# Patient Record
Sex: FEMALE | Race: WHITE | URBAN - METROPOLITAN AREA
[De-identification: names, ages, dates, MRNs, and addresses within clinical notes are randomized per-mention and may not be internally consistent; named-entity substitution may affect disease eponyms.]

---

## 2018-04-10 ENCOUNTER — HOSPITAL ENCOUNTER (INPATIENT)
Facility: CLINIC | Age: 53
LOS: 5 days | Discharge: HOME | DRG: 872 | End: 2018-04-15
Attending: EMERGENCY MEDICINE | Admitting: INTERNAL MEDICINE
Payer: COMMERCIAL

## 2018-04-10 DIAGNOSIS — L03.116 LEFT LEG CELLULITIS: ICD-10-CM

## 2018-04-10 DIAGNOSIS — A41.9 SEPSIS, DUE TO UNSPECIFIED ORGANISM: ICD-10-CM

## 2018-04-10 LAB
ALBUMIN SERPL-MCNC: 3.4 G/DL (ref 3.4–5)
ALP SERPL-CCNC: 73 U/L (ref 40–150)
ALT SERPL W P-5'-P-CCNC: 28 U/L (ref 0–50)
ANION GAP SERPL CALCULATED.3IONS-SCNC: 9 MMOL/L (ref 3–14)
APTT PPP: 30 SEC (ref 22–37)
AST SERPL W P-5'-P-CCNC: 25 U/L (ref 0–45)
BASOPHILS # BLD AUTO: 0 10E9/L (ref 0–0.2)
BASOPHILS NFR BLD AUTO: 0.2 %
BILIRUB SERPL-MCNC: 0.7 MG/DL (ref 0.2–1.3)
BUN SERPL-MCNC: 10 MG/DL (ref 7–30)
CALCIUM SERPL-MCNC: 8.8 MG/DL (ref 8.5–10.1)
CHLORIDE SERPL-SCNC: 101 MMOL/L (ref 94–109)
CO2 BLDCOV-SCNC: 19 MMOL/L (ref 21–28)
CO2 SERPL-SCNC: 23 MMOL/L (ref 20–32)
CREAT SERPL-MCNC: 0.79 MG/DL (ref 0.52–1.04)
DIFFERENTIAL METHOD BLD: ABNORMAL
EOSINOPHIL # BLD AUTO: 0 10E9/L (ref 0–0.7)
EOSINOPHIL NFR BLD AUTO: 0 %
ERYTHROCYTE [DISTWIDTH] IN BLOOD BY AUTOMATED COUNT: 13.3 % (ref 10–15)
GFR SERPL CREATININE-BSD FRML MDRD: 76 ML/MIN/1.7M2
GLUCOSE SERPL-MCNC: 107 MG/DL (ref 70–99)
HCG SERPL QL: NEGATIVE
HCT VFR BLD AUTO: 40.9 % (ref 35–47)
HGB BLD-MCNC: 14 G/DL (ref 11.7–15.7)
IMM GRANULOCYTES # BLD: 0.1 10E9/L (ref 0–0.4)
IMM GRANULOCYTES NFR BLD: 0.7 %
INR PPP: 1.08 (ref 0.86–1.14)
LACTATE BLD-SCNC: 1.6 MMOL/L (ref 0.7–2)
LACTATE BLD-SCNC: 1.6 MMOL/L (ref 0.7–2.1)
LYMPHOCYTES # BLD AUTO: 0.7 10E9/L (ref 0.8–5.3)
LYMPHOCYTES NFR BLD AUTO: 6.1 %
MCH RBC QN AUTO: 32.7 PG (ref 26.5–33)
MCHC RBC AUTO-ENTMCNC: 34.2 G/DL (ref 31.5–36.5)
MCV RBC AUTO: 96 FL (ref 78–100)
MONOCYTES # BLD AUTO: 0.4 10E9/L (ref 0–1.3)
MONOCYTES NFR BLD AUTO: 4 %
NEUTROPHILS # BLD AUTO: 9.5 10E9/L (ref 1.6–8.3)
NEUTROPHILS NFR BLD AUTO: 89 %
NRBC # BLD AUTO: 0 10*3/UL
NRBC BLD AUTO-RTO: 0 /100
PCO2 BLDV: 28 MM HG (ref 40–50)
PH BLDV: 7.44 PH (ref 7.32–7.43)
PLATELET # BLD AUTO: 212 10E9/L (ref 150–450)
PO2 BLDV: 39 MM HG (ref 25–47)
POTASSIUM SERPL-SCNC: 3.3 MMOL/L (ref 3.4–5.3)
PROT SERPL-MCNC: 7.1 G/DL (ref 6.8–8.8)
RBC # BLD AUTO: 4.28 10E12/L (ref 3.8–5.2)
SAO2 % BLDV FROM PO2: 77 %
SODIUM SERPL-SCNC: 133 MMOL/L (ref 133–144)
WBC # BLD AUTO: 10.6 10E9/L (ref 4–11)

## 2018-04-10 PROCEDURE — 96368 THER/DIAG CONCURRENT INF: CPT

## 2018-04-10 PROCEDURE — 83605 ASSAY OF LACTIC ACID: CPT

## 2018-04-10 PROCEDURE — 96375 TX/PRO/DX INJ NEW DRUG ADDON: CPT

## 2018-04-10 PROCEDURE — 99223 1ST HOSP IP/OBS HIGH 75: CPT | Mod: AI | Performed by: INTERNAL MEDICINE

## 2018-04-10 PROCEDURE — 96365 THER/PROPH/DIAG IV INF INIT: CPT

## 2018-04-10 PROCEDURE — 82803 BLOOD GASES ANY COMBINATION: CPT

## 2018-04-10 PROCEDURE — 25000132 ZZH RX MED GY IP 250 OP 250 PS 637: Performed by: EMERGENCY MEDICINE

## 2018-04-10 PROCEDURE — 80053 COMPREHEN METABOLIC PANEL: CPT | Performed by: EMERGENCY MEDICINE

## 2018-04-10 PROCEDURE — 96366 THER/PROPH/DIAG IV INF ADDON: CPT

## 2018-04-10 PROCEDURE — 83605 ASSAY OF LACTIC ACID: CPT | Performed by: EMERGENCY MEDICINE

## 2018-04-10 PROCEDURE — 87040 BLOOD CULTURE FOR BACTERIA: CPT | Performed by: EMERGENCY MEDICINE

## 2018-04-10 PROCEDURE — 36415 COLL VENOUS BLD VENIPUNCTURE: CPT | Performed by: EMERGENCY MEDICINE

## 2018-04-10 PROCEDURE — 85730 THROMBOPLASTIN TIME PARTIAL: CPT | Performed by: EMERGENCY MEDICINE

## 2018-04-10 PROCEDURE — 93005 ELECTROCARDIOGRAM TRACING: CPT

## 2018-04-10 PROCEDURE — 12000000 ZZH R&B MED SURG/OB

## 2018-04-10 PROCEDURE — 20000003 ZZH R&B ICU

## 2018-04-10 PROCEDURE — 96367 TX/PROPH/DG ADDL SEQ IV INF: CPT

## 2018-04-10 PROCEDURE — 36415 COLL VENOUS BLD VENIPUNCTURE: CPT

## 2018-04-10 PROCEDURE — 84703 CHORIONIC GONADOTROPIN ASSAY: CPT | Performed by: EMERGENCY MEDICINE

## 2018-04-10 PROCEDURE — 85025 COMPLETE CBC W/AUTO DIFF WBC: CPT | Performed by: EMERGENCY MEDICINE

## 2018-04-10 PROCEDURE — 25000128 H RX IP 250 OP 636: Performed by: EMERGENCY MEDICINE

## 2018-04-10 PROCEDURE — 99285 EMERGENCY DEPT VISIT HI MDM: CPT | Mod: 25

## 2018-04-10 PROCEDURE — 85610 PROTHROMBIN TIME: CPT | Performed by: EMERGENCY MEDICINE

## 2018-04-10 RX ORDER — ONDANSETRON 2 MG/ML
4 INJECTION INTRAMUSCULAR; INTRAVENOUS ONCE
Status: COMPLETED | OUTPATIENT
Start: 2018-04-10 | End: 2018-04-10

## 2018-04-10 RX ORDER — SODIUM CHLORIDE, SODIUM LACTATE, POTASSIUM CHLORIDE, CALCIUM CHLORIDE 600; 310; 30; 20 MG/100ML; MG/100ML; MG/100ML; MG/100ML
1000 INJECTION, SOLUTION INTRAVENOUS CONTINUOUS
Status: DISCONTINUED | OUTPATIENT
Start: 2018-04-10 | End: 2018-04-11

## 2018-04-10 RX ORDER — CIPROFLOXACIN 2 MG/ML
400 INJECTION, SOLUTION INTRAVENOUS ONCE
Status: COMPLETED | OUTPATIENT
Start: 2018-04-10 | End: 2018-04-10

## 2018-04-10 RX ORDER — POTASSIUM CHLORIDE 1500 MG/1
40 TABLET, EXTENDED RELEASE ORAL ONCE
Status: COMPLETED | OUTPATIENT
Start: 2018-04-10 | End: 2018-04-10

## 2018-04-10 RX ORDER — HYDROMORPHONE HYDROCHLORIDE 1 MG/ML
0.5 INJECTION, SOLUTION INTRAMUSCULAR; INTRAVENOUS; SUBCUTANEOUS
Status: DISCONTINUED | OUTPATIENT
Start: 2018-04-10 | End: 2018-04-11

## 2018-04-10 RX ORDER — NAPROXEN SODIUM 220 MG
220 TABLET ORAL 2 TIMES DAILY PRN
COMMUNITY

## 2018-04-10 RX ORDER — PROMETHAZINE HYDROCHLORIDE 25 MG/ML
25 INJECTION, SOLUTION INTRAMUSCULAR; INTRAVENOUS ONCE
Status: COMPLETED | OUTPATIENT
Start: 2018-04-10 | End: 2018-04-10

## 2018-04-10 RX ORDER — MULTIPLE VITAMINS W/ MINERALS TAB 9MG-400MCG
1 TAB ORAL DAILY
COMMUNITY

## 2018-04-10 RX ORDER — AMPICILLIN AND SULBACTAM 2; 1 G/1; G/1
3 INJECTION, POWDER, FOR SOLUTION INTRAMUSCULAR; INTRAVENOUS ONCE
Status: COMPLETED | OUTPATIENT
Start: 2018-04-10 | End: 2018-04-10

## 2018-04-10 RX ORDER — LORATADINE 10 MG/1
10 TABLET ORAL DAILY
COMMUNITY

## 2018-04-10 RX ADMIN — SODIUM CHLORIDE, POTASSIUM CHLORIDE, SODIUM LACTATE AND CALCIUM CHLORIDE 1000 ML: 600; 310; 30; 20 INJECTION, SOLUTION INTRAVENOUS at 22:37

## 2018-04-10 RX ADMIN — PROMETHAZINE HYDROCHLORIDE 25 MG: 25 INJECTION INTRAMUSCULAR; INTRAVENOUS at 22:00

## 2018-04-10 RX ADMIN — POTASSIUM CHLORIDE 40 MEQ: 1500 TABLET, EXTENDED RELEASE ORAL at 22:41

## 2018-04-10 RX ADMIN — ONDANSETRON 4 MG: 2 INJECTION INTRAMUSCULAR; INTRAVENOUS at 20:17

## 2018-04-10 RX ADMIN — SODIUM CHLORIDE, POTASSIUM CHLORIDE, SODIUM LACTATE AND CALCIUM CHLORIDE 1000 ML: 600; 310; 30; 20 INJECTION, SOLUTION INTRAVENOUS at 19:56

## 2018-04-10 RX ADMIN — CIPROFLOXACIN 400 MG: 2 INJECTION, SOLUTION INTRAVENOUS at 20:50

## 2018-04-10 RX ADMIN — AMPICILLIN SODIUM AND SULBACTAM SODIUM 3 G: 2; 1 INJECTION, POWDER, FOR SOLUTION INTRAMUSCULAR; INTRAVENOUS at 20:18

## 2018-04-10 RX ADMIN — VANCOMYCIN HYDROCHLORIDE 2250 MG: 1 INJECTION, POWDER, LYOPHILIZED, FOR SOLUTION INTRAVENOUS at 21:14

## 2018-04-10 RX ADMIN — SODIUM CHLORIDE, POTASSIUM CHLORIDE, SODIUM LACTATE AND CALCIUM CHLORIDE 1000 ML: 600; 310; 30; 20 INJECTION, SOLUTION INTRAVENOUS at 21:13

## 2018-04-10 RX ADMIN — HYDROMORPHONE HYDROCHLORIDE 0.5 MG: 1 INJECTION, SOLUTION INTRAMUSCULAR; INTRAVENOUS; SUBCUTANEOUS at 19:55

## 2018-04-10 ASSESSMENT — ENCOUNTER SYMPTOMS
ABDOMINAL PAIN: 0
WOUND: 0
MYALGIAS: 1
COLOR CHANGE: 1
COUGH: 0
DYSURIA: 0
VOMITING: 0
RHINORRHEA: 0
FEVER: 1
NAUSEA: 0

## 2018-04-10 NOTE — IP AVS SNAPSHOT
[place] [address] [facility]    [address]    [facility] [address]    Phone:  [phone]    Fax:  [phone]                                       After Visit Summary   4/10/2018    April Ackerman    MRN: [id number]           After Visit Summary Signature Page     I have received my discharge instructions, and my questions have been answered. I have discussed any challenges I see with this plan with the nurse or doctor.    ..........................................................................................................................................  Patient/Patient Representative Signature      ..........................................................................................................................................  Patient Representative Print Name and Relationship to Patient    ..................................................               ................................................  Date                                            Time    ..........................................................................................................................................  Reviewed by Signature/Title    ...................................................              ..............................................  Date                                                            Time

## 2018-04-10 NOTE — IP AVS SNAPSHOT
MRN:[id number]                      After Visit Summary   4/10/2018    April Ackerman    MRN: [id number]           Thank you!     Thank you for choosing [facility] for your care. Our goal is always to provide you with excellent care. Hearing back from our patients is one way we can continue to improve our services. Please take a few minutes to complete the written survey that you may receive in the mail after you visit. If you would like to speak to someone directly about your visit please contact Patient Relations at [phone]. Thank you!          Patient Information     Date Of Birth          1965        Designated Caregiver       Most Recent Value    Caregiver    Will someone help with your care after discharge? no      About your hospital stay     You were admitted on:  April 10, 2018 You last received care in the:  [address] [facility]    You were discharged on:  April 15, 2018        Reason for your hospital stay       Cellulitis                  Who to Call     For medical emergencies, please call 911.  For non-urgent questions about your medical care, please call your primary care provider or clinic, [phone]          Attending Provider     Provider Specialty    James Page MD Emergency Medicine    Keanu Reid DO Internal Medicine       Primary Care Provider Office Phone # Fax #    [facility] [phone]       When to contact your care team       Call your primary doctor if you have any of the following: increased swelling or increased pain.                  After Care Instructions     Activity       Your activity upon discharge: activity as tolerated            Diet       Follow this diet upon discharge: Orders Placed This Encounter      Advance Diet as Tolerated: Regular Diet Adult                  Follow-up Appointments     Follow-up and recommended labs and tests        Follow up with primary care  "provider, [facility], within 7 days for hospital follow- up.                  Pending Results     Date and Time Order Name Status Description    4/10/2018 1941 Blood culture Preliminary     4/10/2018 1936 Blood culture Preliminary             Statement of Approval     Ordered          04/15/18 1501  I have reviewed and agree with all the recommendations and orders detailed in this document.  EFFECTIVE NOW     Approved and electronically signed by:  Escobar Burkett MD             Admission Information     Date & Time Provider Department Dept. Phone    4/10/2018 Keanu Reid,  [place] [address] [facility] [phone]      Your Vitals Were     Blood Pressure Pulse Temperature Respirations Height Weight    122/85 (BP Location: Left arm) 89 96.4  F (35.8  C) (Oral) 20 1.676 m (5' 6\") 96.7 kg (213 lb 3 oz)    Pulse Oximetry BMI (Body Mass Index)                93% 34.41 kg/m2          [employer] Information     [employer] lets you send messages to your doctor, view your test results, renew your prescriptions, schedule appointments and more. To sign up, go to [email or web address] . Click on \"Log in\" on the left side of the screen, which will take you to the Welcome page. Then click on \"Sign up Now\" on the right side of the page.     You will be asked to enter the access code listed below, as well as some personal information. Please follow the directions to create your username and password.     Your access code is: [email or web address]  Expires: 2018  3:00 PM     Your access code will  in 90 days. If you need help or a new code, please call your [place] clinic or [phone].        Care EveryWhere ID     This is your Care EveryWhere ID. This could be used by other organizations to access your [place] medical records  [id number]        Equal Access to Services     MILAD NELSON AH: Remi Contreras, wajose magaña, qaybta kaalmichael philip, kobi causey " queenie alfaroaan ah. So [facility] [phone].    ATENCIÓN: Si darren myers, tiene a rainey disposición servicios gratuitos de asistencia lingüística. Monika barfield [phone].    We comply with applicable federal civil rights laws and [place] laws. We do not discriminate on the basis of race, color, national origin, age, disability, sex, sexual orientation, or gender identity.               Review of your medicines      START taking        Dose / Directions    amoxicillin-clavulanate 875-125 MG per tablet   Commonly known as:  AUGMENTIN   Used for:  Left leg cellulitis        Dose:  1 tablet   Take 1 tablet by mouth 2 times daily for 7 days   Quantity:  14 tablet   Refills:  0         CONTINUE these medicines which have NOT CHANGED        Dose / Directions    ALEVE 220 MG tablet   Generic drug:  naproxen sodium        Dose:  220 mg   Take 220 mg by mouth 2 times daily as needed for moderate pain   Refills:  0       loratadine 10 MG tablet   Commonly known as:  CLARITIN        Dose:  10 mg   Take 10 mg by mouth daily   Refills:  0       multivitamin, therapeutic with minerals Tabs tablet        Dose:  1 tablet   Take 1 tablet by mouth daily   Refills:  0            Where to get your medicines      These medications were sent to [facility] [address] [place] [address] [address] AT [facility] [address] [facility] [address]     Phone:  [phone]     amoxicillin-clavulanate 875-125 MG per tablet                Protect others around you: Learn how to safely use, store and throw away your medicines at www.disposemymeds.org.        ANTIBIOTIC INSTRUCTION     You've Been Prescribed an Antibiotic - Now What?  Your healthcare team thinks that you or your loved one might have an infection. Some infections can be treated with antibiotics, which are powerful, life-saving drugs. Like all medications, antibiotics have side effects and should only be used when necessary. There are some important  things you should know about your antibiotic treatment.      Your healthcare team may run tests before you start taking an antibiotic.    Your team may take samples (e.g., from your blood, urine or other areas) to run tests to look for bacteria. These test can be important to determine if you need an antibiotic at all and, if you do, which antibiotic will work best.      Within a few days, your healthcare team might change or even stop your antibiotic.    Your team may start you on an antibiotic while they are working to find out what is making you sick.    Your team might change your antibiotic because test results show that a different antibiotic would be better to treat your infection.    In some cases, once your team has more information, they learn that you do not need an antibiotic at all. They may find out that you don't have an infection, or that the antibiotic you're taking won't work against your infection. For example, an infection caused by a virus can't be treated with antibiotics. Staying on an antibiotic when you don't need it is more likely to be harmful than helpful.      You may experience side effects from your antibiotic.    Like all medications, antibiotics have side effects. Some of these can be serious.    Let you healthcare team know if you have any known allergies when you are admitted to the hospital.    One significant side effect of nearly all antibiotics is the risk of severe and sometimes deadly diarrhea caused by Clostridium difficile (C. Difficile). This occurs when a person takes antibiotics because some good germs are destroyed. Antibiotic use allows C. diificile to take over, putting patients at high risk for this serious infection.    As a patient or caregiver, it is important to understand your or your loved one's antibiotic treatment. It is especially important for caregivers to speak up when patients can't speak for themselves. Here are some important questions to ask your  healthcare team.    What infection is this antibiotic treating and how do you know I have that infection?    What side effects might occur from this antibiotic?    How long will I need to take this antibiotic?    Is it safe to take this antibiotic with other medications or supplements (e.g., vitamins) that I am taking?     Are there any special directions I need to know about taking this antibiotic? For example, should I take it with food?    How will I be monitored to know whether my infection is responding to the antibiotic?    What tests may help to make sure the right antibiotic is prescribed for me?      Information provided by:  www.cdc.gov/getsmart  U.S. Department of Health and Human Services  Centers for disease Control and Prevention  National Center for Emerging and Zoonotic Infectious Diseases  Division of Healthcare Quality Promotion             Medication List: This is a list of all your medications and when to take them. Check marks below indicate your daily home schedule. Keep this list as a reference.      Medications           Morning Afternoon Evening Bedtime As Needed    ALEVE 220 MG tablet   Take 220 mg by mouth 2 times daily as needed for moderate pain   Generic drug:  naproxen sodium   Next Dose Due:  Resume at home schedule                                  amoxicillin-clavulanate 875-125 MG per tablet   Commonly known as:  AUGMENTIN   Take 1 tablet by mouth 2 times daily for 7 days   Next Dose Due:  Resume at home schedule                                loratadine 10 MG tablet   Commonly known as:  CLARITIN   Take 10 mg by mouth daily   Next Dose Due:  Resume at home schedule                                multivitamin, therapeutic with minerals Tabs tablet   Take 1 tablet by mouth daily   Next Dose Due:  Resume at home schedule                                          More Information        Cellulitis  Cellulitis is an infection of the deep layers of skin. A break in the skin, such as a cut  or scratch, can let bacteria under the skin. If the bacteria get to deep layers of the skin, it can be serious. If not treated, cellulitis can get into the bloodstream and lymph nodes. The infection can then spread throughout the body. This causes serious illness.  Cellulitis causes the affected skin to become red, swollen, warm, and sore. The reddened areas have a visible border. An open sore may leak fluid (pus). You may have a fever, chills, and pain.  Cellulitis is treated with antibiotics taken for 7 to 10 days. An open sore may be cleaned and covered with cool wet gauze. Symptoms should get better 1 to 2 days after treatment is started. Make sure to take all the antibiotics for the full number of days until they are gone. Keep taking the medicine even if your symptoms go away.  Home care  Follow these tips:    Limit the use of the part of your body with cellulitis.     If the infection is on your leg, keep your leg raised while sitting. This will help to reduce swelling.    Take all of the antibiotic medicine exactly as directed until it is gone. Do not miss any doses, especially during the first 7 days. Don t stop taking the medicine when your symptoms get better.    Keep the affected area clean and dry.    Wash your hands with soap and warm water before and after touching your skin. Anyone else who touches your skin should also wash his or her hands. Don't share towels.  Follow-up care  Follow up with your healthcare provider, or as advised. If your infection does not go away on the first antibiotic, your healthcare provider will prescribe a different one.  When to seek medical advice  Call your healthcare provider right away if any of these occur:    Red areas that spread    Swelling or pain that gets worse    Fluid leaking from the skin (pus)    Fever higher of 100.4  F (38.0  C) or higher after 2 days on antibiotics  Date Last Reviewed: 9/1/2016 2000-2017 [employer]. [address], [place] [address]. All rights reserved. This information is not intended as a substitute for professional medical care. Always follow your healthcare professional's instructions.

## 2018-04-10 NOTE — Clinical Note
Admitting Physician: JENNIFER TAFOYA [[id number]]   [facility][id number][facility]   Bed Type: Adult Med/Surg [46]   Special needs: Fall Risk [8]   Bed request comments: Med unit bed requested @6684

## 2018-04-11 ENCOUNTER — APPOINTMENT (OUTPATIENT)
Dept: ULTRASOUND IMAGING | Facility: CLINIC | Age: 53
DRG: 872 | End: 2018-04-11
Attending: INTERNAL MEDICINE
Payer: COMMERCIAL

## 2018-04-11 PROBLEM — L03.90 CELLULITIS: Status: ACTIVE | Noted: 2018-04-11

## 2018-04-11 LAB
CREAT SERPL-MCNC: 0.7 MG/DL (ref 0.52–1.04)
FLUAV+FLUBV RNA SPEC QL NAA+PROBE: NEGATIVE
FLUAV+FLUBV RNA SPEC QL NAA+PROBE: NEGATIVE
GFR SERPL CREATININE-BSD FRML MDRD: 87 ML/MIN/1.7M2
GLUCOSE BLDC GLUCOMTR-MCNC: 116 MG/DL (ref 70–99)
GLUCOSE BLDC GLUCOMTR-MCNC: 119 MG/DL (ref 70–99)
GLUCOSE BLDC GLUCOMTR-MCNC: 128 MG/DL (ref 70–99)
GLUCOSE BLDC GLUCOMTR-MCNC: 135 MG/DL (ref 70–99)
INTERPRETATION ECG - MUSE: NORMAL
MRSA DNA SPEC QL NAA+PROBE: NEGATIVE
PLATELET # BLD AUTO: 176 10E9/L (ref 150–450)
POTASSIUM SERPL-SCNC: 4.1 MMOL/L (ref 3.4–5.3)
RSV RNA SPEC NAA+PROBE: NEGATIVE
SPECIMEN SOURCE: NORMAL
SPECIMEN SOURCE: NORMAL

## 2018-04-11 PROCEDURE — 12000000 ZZH R&B MED SURG/OB

## 2018-04-11 PROCEDURE — 84132 ASSAY OF SERUM POTASSIUM: CPT | Performed by: INTERNAL MEDICINE

## 2018-04-11 PROCEDURE — 82565 ASSAY OF CREATININE: CPT | Performed by: INTERNAL MEDICINE

## 2018-04-11 PROCEDURE — 87640 STAPH A DNA AMP PROBE: CPT | Performed by: INTERNAL MEDICINE

## 2018-04-11 PROCEDURE — 00000146 ZZHCL STATISTIC GLUCOSE BY METER IP

## 2018-04-11 PROCEDURE — 36415 COLL VENOUS BLD VENIPUNCTURE: CPT | Performed by: INTERNAL MEDICINE

## 2018-04-11 PROCEDURE — 25000128 H RX IP 250 OP 636: Performed by: INTERNAL MEDICINE

## 2018-04-11 PROCEDURE — 25000132 ZZH RX MED GY IP 250 OP 250 PS 637: Performed by: INTERNAL MEDICINE

## 2018-04-11 PROCEDURE — 85049 AUTOMATED PLATELET COUNT: CPT | Performed by: INTERNAL MEDICINE

## 2018-04-11 PROCEDURE — 87631 RESP VIRUS 3-5 TARGETS: CPT | Performed by: INTERNAL MEDICINE

## 2018-04-11 PROCEDURE — 25000125 ZZHC RX 250: Performed by: INTERNAL MEDICINE

## 2018-04-11 PROCEDURE — 93971 EXTREMITY STUDY: CPT | Mod: LT

## 2018-04-11 PROCEDURE — 99233 SBSQ HOSP IP/OBS HIGH 50: CPT | Performed by: INTERNAL MEDICINE

## 2018-04-11 PROCEDURE — 87641 MR-STAPH DNA AMP PROBE: CPT | Performed by: INTERNAL MEDICINE

## 2018-04-11 RX ORDER — PROCHLORPERAZINE MALEATE 5 MG
10 TABLET ORAL EVERY 6 HOURS PRN
Status: DISCONTINUED | OUTPATIENT
Start: 2018-04-11 | End: 2018-04-15 | Stop reason: HOSPADM

## 2018-04-11 RX ORDER — PROCHLORPERAZINE 25 MG
25 SUPPOSITORY, RECTAL RECTAL EVERY 12 HOURS PRN
Status: DISCONTINUED | OUTPATIENT
Start: 2018-04-11 | End: 2018-04-15 | Stop reason: HOSPADM

## 2018-04-11 RX ORDER — LEVOFLOXACIN 5 MG/ML
750 INJECTION, SOLUTION INTRAVENOUS EVERY 24 HOURS
Status: DISCONTINUED | OUTPATIENT
Start: 2018-04-11 | End: 2018-04-15 | Stop reason: HOSPADM

## 2018-04-11 RX ORDER — DOXYCYCLINE 100 MG/10ML
100 INJECTION, POWDER, LYOPHILIZED, FOR SOLUTION INTRAVENOUS EVERY 12 HOURS
Status: DISCONTINUED | OUTPATIENT
Start: 2018-04-11 | End: 2018-04-13

## 2018-04-11 RX ORDER — POTASSIUM CHLORIDE 29.8 MG/ML
20 INJECTION INTRAVENOUS
Status: DISCONTINUED | OUTPATIENT
Start: 2018-04-11 | End: 2018-04-15 | Stop reason: HOSPADM

## 2018-04-11 RX ORDER — TRAZODONE HYDROCHLORIDE 50 MG/1
50 TABLET, FILM COATED ORAL
Status: DISCONTINUED | OUTPATIENT
Start: 2018-04-11 | End: 2018-04-15 | Stop reason: HOSPADM

## 2018-04-11 RX ORDER — POTASSIUM CHLORIDE 7.45 MG/ML
10 INJECTION INTRAVENOUS
Status: DISCONTINUED | OUTPATIENT
Start: 2018-04-11 | End: 2018-04-15 | Stop reason: HOSPADM

## 2018-04-11 RX ORDER — SODIUM CHLORIDE 9 MG/ML
INJECTION, SOLUTION INTRAVENOUS CONTINUOUS
Status: DISCONTINUED | OUTPATIENT
Start: 2018-04-11 | End: 2018-04-15 | Stop reason: HOSPADM

## 2018-04-11 RX ORDER — POTASSIUM CHLORIDE 1.5 G/1.58G
20-40 POWDER, FOR SOLUTION ORAL
Status: DISCONTINUED | OUTPATIENT
Start: 2018-04-11 | End: 2018-04-15 | Stop reason: HOSPADM

## 2018-04-11 RX ORDER — ONDANSETRON 2 MG/ML
4 INJECTION INTRAMUSCULAR; INTRAVENOUS EVERY 6 HOURS PRN
Status: DISCONTINUED | OUTPATIENT
Start: 2018-04-11 | End: 2018-04-15 | Stop reason: HOSPADM

## 2018-04-11 RX ORDER — CEFAZOLIN SODIUM 1 G/50ML
1 INJECTION, SOLUTION INTRAVENOUS EVERY 8 HOURS
Status: DISCONTINUED | OUTPATIENT
Start: 2018-04-11 | End: 2018-04-11

## 2018-04-11 RX ORDER — NALOXONE HYDROCHLORIDE 0.4 MG/ML
.1-.4 INJECTION, SOLUTION INTRAMUSCULAR; INTRAVENOUS; SUBCUTANEOUS
Status: DISCONTINUED | OUTPATIENT
Start: 2018-04-11 | End: 2018-04-15 | Stop reason: HOSPADM

## 2018-04-11 RX ORDER — DOXYCYCLINE 100 MG/10ML
100 INJECTION, POWDER, LYOPHILIZED, FOR SOLUTION INTRAVENOUS EVERY 12 HOURS
Status: DISCONTINUED | OUTPATIENT
Start: 2018-04-11 | End: 2018-04-11

## 2018-04-11 RX ORDER — OXYCODONE HYDROCHLORIDE 5 MG/1
5-10 TABLET ORAL
Status: DISCONTINUED | OUTPATIENT
Start: 2018-04-11 | End: 2018-04-15 | Stop reason: HOSPADM

## 2018-04-11 RX ORDER — POTASSIUM CHLORIDE 1500 MG/1
20-40 TABLET, EXTENDED RELEASE ORAL
Status: DISCONTINUED | OUTPATIENT
Start: 2018-04-11 | End: 2018-04-15 | Stop reason: HOSPADM

## 2018-04-11 RX ORDER — CEFAZOLIN SODIUM 2 G/100ML
2 INJECTION, SOLUTION INTRAVENOUS EVERY 8 HOURS
Status: DISCONTINUED | OUTPATIENT
Start: 2018-04-11 | End: 2018-04-15 | Stop reason: HOSPADM

## 2018-04-11 RX ORDER — ONDANSETRON 4 MG/1
4 TABLET, ORALLY DISINTEGRATING ORAL EVERY 6 HOURS PRN
Status: DISCONTINUED | OUTPATIENT
Start: 2018-04-11 | End: 2018-04-15 | Stop reason: HOSPADM

## 2018-04-11 RX ORDER — LIDOCAINE 40 MG/G
CREAM TOPICAL
Status: DISCONTINUED | OUTPATIENT
Start: 2018-04-11 | End: 2018-04-15 | Stop reason: HOSPADM

## 2018-04-11 RX ORDER — ACETAMINOPHEN 325 MG/1
650 TABLET ORAL EVERY 4 HOURS PRN
Status: DISCONTINUED | OUTPATIENT
Start: 2018-04-11 | End: 2018-04-15 | Stop reason: HOSPADM

## 2018-04-11 RX ORDER — POTASSIUM CL/LIDO/0.9 % NACL 10MEQ/0.1L
10 INTRAVENOUS SOLUTION, PIGGYBACK (ML) INTRAVENOUS
Status: DISCONTINUED | OUTPATIENT
Start: 2018-04-11 | End: 2018-04-15 | Stop reason: HOSPADM

## 2018-04-11 RX ORDER — HEPARIN SODIUM,PORCINE 10 UNIT/ML
2-5 VIAL (ML) INTRAVENOUS
Status: DISCONTINUED | OUTPATIENT
Start: 2018-04-11 | End: 2018-04-15 | Stop reason: HOSPADM

## 2018-04-11 RX ADMIN — ENOXAPARIN SODIUM 40 MG: 40 INJECTION SUBCUTANEOUS at 08:34

## 2018-04-11 RX ADMIN — DOXYCYCLINE 100 MG: 100 INJECTION, POWDER, LYOPHILIZED, FOR SOLUTION INTRAVENOUS at 03:59

## 2018-04-11 RX ADMIN — ACETAMINOPHEN 650 MG: 325 TABLET ORAL at 04:44

## 2018-04-11 RX ADMIN — OXYCODONE HYDROCHLORIDE 5 MG: 5 TABLET ORAL at 13:40

## 2018-04-11 RX ADMIN — SODIUM CHLORIDE: 9 INJECTION, SOLUTION INTRAVENOUS at 02:17

## 2018-04-11 RX ADMIN — LEVOFLOXACIN 750 MG: 5 INJECTION, SOLUTION INTRAVENOUS at 02:23

## 2018-04-11 RX ADMIN — ACETAMINOPHEN 650 MG: 325 TABLET ORAL at 11:26

## 2018-04-11 RX ADMIN — OXYCODONE HYDROCHLORIDE 5 MG: 5 TABLET ORAL at 17:23

## 2018-04-11 RX ADMIN — SODIUM CHLORIDE: 9 INJECTION, SOLUTION INTRAVENOUS at 21:26

## 2018-04-11 RX ADMIN — CEFAZOLIN SODIUM 2 G: 2 INJECTION, SOLUTION INTRAVENOUS at 21:32

## 2018-04-11 RX ADMIN — SODIUM CHLORIDE 1000 ML: 9 INJECTION, SOLUTION INTRAVENOUS at 01:19

## 2018-04-11 RX ADMIN — CEFAZOLIN SODIUM 2 G: 2 INJECTION, SOLUTION INTRAVENOUS at 14:20

## 2018-04-11 RX ADMIN — DOXYCYCLINE 100 MG: 100 INJECTION, POWDER, LYOPHILIZED, FOR SOLUTION INTRAVENOUS at 15:32

## 2018-04-11 RX ADMIN — ACETAMINOPHEN 650 MG: 325 TABLET ORAL at 19:52

## 2018-04-11 RX ADMIN — CEFAZOLIN SODIUM 1 G: 1 INJECTION, SOLUTION INTRAVENOUS at 05:44

## 2018-04-11 ASSESSMENT — PAIN DESCRIPTION - DESCRIPTORS
DESCRIPTORS: THROBBING
DESCRIPTORS: THROBBING

## 2018-04-11 ASSESSMENT — ACTIVITIES OF DAILY LIVING (ADL)
SWALLOWING: 0-->SWALLOWS FOODS/LIQUIDS WITHOUT DIFFICULTY
ADLS_ACUITY_SCORE: 9
TRANSFERRING: 0-->INDEPENDENT
BATHING: 0-->INDEPENDENT
TOILETING: 0-->INDEPENDENT
DRESS: 0-->INDEPENDENT
FALL_HISTORY_WITHIN_LAST_SIX_MONTHS: NO
RETIRED_COMMUNICATION: 0-->UNDERSTANDS/COMMUNICATES WITHOUT DIFFICULTY
AMBULATION: 0-->INDEPENDENT
RETIRED_EATING: 0-->INDEPENDENT
COGNITION: 0 - NO COGNITION ISSUES REPORTED

## 2018-04-11 NOTE — ED NOTES
[facility]  ED Nurse Handoff Report    April Ackerman is a 52 year old female   ED Chief complaint: Cellulitis  . ED Diagnosis:   Final diagnoses:   Sepsis, due to unspecified organism (H)   Left leg cellulitis     Allergies: No Known Allergies    Code Status: Full Code  Activity level - Baseline/Home:  Independent. Activity Level - Current:   Stand with Assist. Lift room needed: No. Bariatric: No   Needed: No   Isolation: No. Infection: Not Applicable.     Vital Signs:   Vitals:    04/10/18 2030 04/10/18 2045 04/10/18 2100 04/10/18 2115   BP: 93/68 101/72 101/73 95/72   Pulse:       Resp: 19 19 22 21   Temp:       TempSrc:       SpO2: 96% 97% 96% 98%   Weight:       Height:           Cardiac Rhythm:  ,      Pain level: 0-10 Pain Scale: 5  Patient confused: No. Patient Falls Risk: Yes.   Elimination Status: Has voided   Patient Report - Initial Complaint: Leg swelling. Focused Assessment: A&O x4, ABCDs intact. Pt has swelling, redness, and pain in left lower extremity. Pt has had nausea and febrile.   Tests Performed: lab, imaging. Abnormal Results:   Labs Ordered and Resulted from Time of ED Arrival Up to the Time of Departure from the ED   CBC WITH PLATELETS DIFFERENTIAL - Abnormal; Notable for the following:        Result Value    Absolute Neutrophil 9.5 (*)     Absolute Lymphocytes 0.7 (*)     All other components within normal limits   COMPREHENSIVE METABOLIC PANEL - Abnormal; Notable for the following:     Potassium 3.3 (*)     Glucose 107 (*)     All other components within normal limits   ISTAT  GASES LACTATE SAÚL POCT - Abnormal; Notable for the following:     Ph Venous 7.44 (*)     PCO2 Venous 28 (*)     Bicarbonate Venous 19 (*)     All other components within normal limits   LACTIC ACID WHOLE BLOOD   INR   PARTIAL THROMBOPLASTIN TIME   HCG QUALITATIVE   ROUTINE UA WITH MICROSCOPIC   PULSE OXIMETRY NURSING   CARDIAC CONTINUOUS MONITORING   MEASURE URINE OUTPUT   PATIENT CARE ORDER    ISTAT CG4 GASES LACTATE SAÚL NURSING POCT   BLOOD CULTURE   BLOOD CULTURE   URINE CULTURE AEROBIC BACTERIAL     No orders to display        Treatments provided: IVF, ABX, dilaudid  Family Comments: NA  OBS brochure/video discussed/provided to patient:  N/A  ED Medications:   Medications   lactated ringers BOLUS 1,000 mL (0 mLs Intravenous Stopped 4/10/18 2113)     Followed by   lactated ringers BOLUS 1,000 mL (1,000 mLs Intravenous New Bag 4/10/18 2113)     Followed by   lactated ringers infusion (not administered)   HYDROmorphone (PF) (DILAUDID) injection 0.5 mg (0.5 mg Intravenous Given 4/10/18 1955)   ciprofloxacin (CIPRO) infusion 400 mg (400 mg Intravenous New Bag 4/10/18 2050)   vancomycin (VANCOCIN) 2,250 mg in sodium chloride 0.9 % 500 mL intermittent infusion (2,250 mg Intravenous New Bag 4/10/18 2114)   lactated ringers BOLUS 1,000 mL (not administered)   ampicillin-sulbactam (UNASYN) 3 g vial to attach to  mL bag (0 g Intravenous Stopped 4/10/18 2049)   ondansetron (ZOFRAN) injection 4 mg (4 mg Intravenous Given 4/10/18 2017)     Drips infusing:  Yes  For the majority of the shift, the patient's behavior Green. Interventions performed were NA.     Severe Sepsis OR Septic Shock Diagnosis Present: No      ED Nurse Name/Phone Number: Teja Karthik,   9:40 PM

## 2018-04-11 NOTE — PROGRESS NOTES
Pt transferred to room 541 from [facility] via WC escorted by LPN. Report given to floor MESHA Walls at 1630. No further questions. All pt belongings sent with pt at time of transfer. See flowsheet for additional assessment information.

## 2018-04-11 NOTE — PLAN OF CARE
"Problem: Infection, Risk/Actual (Adult)  Intervention: Manage Suspected/Actual Infection  Pt's LLE below the knee to the foot hot, red, swollen and warm to touch. Redness not approximated. Redness extending over entire LLE/foot. Pt complaints of mild LLE pain at rest sometimes throbbing. Pt states leg is \"not as painful when touched\" as it was yesterday.  Pain increases to \"10/10\" on verbal pain scale when leg is dependant or pt places pressure on it to stand. Pt given tylenol per pt request for pain control and LLE elevated up on pillows as well as sween cream applied. See flowsheet and MAR for additional assessment information.        "

## 2018-04-11 NOTE — PHARMACY-ADMISSION MEDICATION HISTORY
Admission medication history interview status for this patient is complete. See [facility] admission navigator for allergy information, prior to admission medications and immunization status.     Medication history interview source(s):Patient and Family  Medication history resources (including written lists, pill bottles, clinic record):None  Primary pharmacy:-    Changes made to PTA medication list:  Added: claritin, mvi, aleve  Deleted: -  Changed: -    Actions taken by pharmacist (provider contacted, etc):None     Additional medication history information:None    Medication reconciliation/reorder completed by provider prior to medication history? No    Do you take OTC medications (eg tylenol, ibuprofen, fish oil, eye/ear drops, etc)? yes(Y/N)    For patients on insulin therapy: no (Y/N)  Lantus/levemir/NPH/Mix 70/30 dose:   (Y/N) (see Med list for doses)   Sliding scale Novolog Y/N  If Yes, do you have a baseline novolog pre-meal dose:  units with meals  Patients eat three meals a day:   Y/N    How many episodes of hypoglycemia do you have per week: _______  How many missed doses do you have per week: ______  How many times do you check your blood glucose per day: _______   Any Barriers to therapy - Be specific :  cost of medications, comfortable with giving injections (if applicable), comfortable and confident with current diabetes regimen: Y/N ______________      Prior to Admission medications    Medication Sig Last Dose Taking? Auth Provider   loratadine (CLARITIN) 10 MG tablet Take 10 mg by mouth daily 4/10/2018 at Unknown time Yes Unknown, Entered By History   multivitamin, therapeutic with minerals (THERA-VIT-M) TABS tablet Take 1 tablet by mouth daily 4/10/2018 at Unknown time Yes Unknown, Entered By History   naproxen sodium (ALEVE) 220 MG tablet Take 220 mg by mouth 2 times daily as needed for moderate pain  Yes Unknown, Entered By History

## 2018-04-11 NOTE — CONSULTS
Consult Date:  04/11/2018      INFECTIOUS DISEASE CONSULTATION      ROOM#:  [facility].      REFERRING PHYSICIAN:  Kavita Veras MD      IMPRESSION:  A healthy 52-year-old female with acute sepsis and left leg cellulitis, quite typical streptococcal appearance, no obvious underlying stasis or edema and no entry site.  Recent ocean water exposure and slight cut so conceivably water borne pathogen, although timing makes that relatively unlikely as does appearance.      RECOMMENDATIONS:   1.  Agree with Ancef, increased dose.  For now, Cipro and doxycycline covering water borne pathogens but they are relatively unlikely here.   2.  Elevate leg, no other major workup at this point, await blood cultures to see if bacteremic.   3.  Discussed in detail with the patient.      HISTORY:  This 52-year-old female is seen in consultation due to left leg cellulitis.  The patient is a healthy female without major medical problems and no significant infection history.  She has never really notice significant edema in her legs, nor has she had prior cellulitis or infections.  She has generally not required antibiotics for other reasons, nor had any resistant pathogen.  Three weeks ago right up until March 29 she was in [place] at a resort which included going in the ocean on several occasions.  She has had a cut from shaving in her left leg and when the ocean after that but did not have any obvious infection occur while she was there or in the last week since coming home.  She then noted abrupt onset less than 48 hours ago of shaking chills to the point of teeth rattling, and then progressive discomfort first in her left thigh and groin area and then lower leg.  At presentation she had an obvious leg cellulitis, was started on antibiotics and admitted.  She had some low blood pressure is in the ICU but currently her mentation is normal.  She feels well, less chills and sweats have been occurring.  No other pain sites  present.      PAST MEDICAL HISTORY:  Fairly unremarkable infection wise and general healthy without major medical problems overall.  Infection wise almost no significant infection problems historically.      ALLERGIES:  No antimicrobial allergies.      SOCIAL AND FAMILY HISTORY:  Recent [place] travel with theoretical potential exposure, no fresh water exposure of note other than swimming pools.        MEDICATIONS:  As listed.      REVIEW OF SYSTEMS:  Largely as listed above, definite systemic symptoms initially, but have resolved.      PHYSICAL EXAMINATION:   GENERAL:  The patient appears her stated age, does not look toxic or ill.   VITAL SIGNS:  Improved currently after initial high fever.  Blood pressure now improved as well, not requiring pressors.   HEENT:  No thrush or intraoral lesions.  Pupils reactive.  No facial skin rashes.   NECK:  Supple and nontender, no lymphadenopathy or thyromegaly.   HEART:  Regular rhythm, no murmur or gallop.   LUNGS:  Clear bilaterally.   ABDOMEN:  Soft and nontender.   EXTREMITIES:  Right leg fairly unremarkable, no stasis.  No wounds.  The left leg has edema and erythema compatible with cellulitis, very tender in the groin area with lymphadenopathy present and definite lymphangitis in the medial thigh, implying streptococcal etiology.      LABORATORY DATA:  Blood cultures pending.  White blood cell count 10,600.        Thank you much for the consultation.  I will follow the patient with you.         PACO POTTS MD             D: 2018   T: 2018   MT: LOIDA      Name:     CHAUNCEY BAIRD   MRN:      [id number]        Account:       [id number]   :      1965           Consult Date:  2018      Document: [id number]       cc: Kavita Veras MD       [facility]

## 2018-04-11 NOTE — ED PROVIDER NOTES
History     Chief Complaint:  Cellulitis    HPI   April Ackerman is a 52 year old female who presents with cellulitis. The patient reports experiencing a fever, chills, and feeling fatigued yesterday. She then measured her temperature to be 102.2 this afternoon. Around that time she also noticed a number of symptoms on her left lower leg including redness, warmth to the touch, the sensation of skin tightness, and pain radiating up the leg. The patient was concerned for cellulitis and decided to come in to the ED for evaluation. Upon arrival here she denies any associated dysuria, cough, runny nose, or other symptoms. Of note, she does report that she and her  were recently in [place] where she states she went swimming in both a pool and the ocean. She denies any cuts, trauma, or other injuries to the leg however. She then suffered from approximately one week of diarrhea upon returning to the  12 days ago. The patient denies any history of skin infections. She last took Ibuprofen this morning and Aleve this afternoon for a headache.     Allergies:  No Known Drug Allergies     Medications:    Claritin    Past Medical History:    The patient denies any relevant past medical history.    Past Surgical History:    History reviewed. No pertinent past surgical history.    Family History:    The patient denies any relevant family medical history.    Social History:  The patient was accompanied to the ED by her .  Marital Status:        Review of Systems   Constitutional: Positive for fever.   HENT: Negative for rhinorrhea.    Respiratory: Negative for cough.    Gastrointestinal: Negative for abdominal pain, nausea and vomiting.   Genitourinary: Negative for dysuria.   Musculoskeletal: Positive for myalgias.   Skin: Positive for color change. Negative for wound.   All other systems reviewed and are negative.    Physical Exam   Vitals:  Patient Vitals for the past 24 hrs:   BP Temp Temp src Pulse Heart  "Rate Resp SpO2 Height Weight   04/10/18 2200 105/72 - - - 106 28 95 % - -   04/10/18 2158 - - - - 97 - 95 % - -   04/10/18 2151 - 98.2  F (36.8  C) Oral - - - - - -   04/10/18 2148 - - - - 98 21 96 % - -   04/10/18 2130 98/54 - - - 92 27 98 % - -   04/10/18 2115 95/72 - - - 102 21 98 % - -   04/10/18 2100 101/73 - - - 103 22 96 % - -   04/10/18 2045 101/72 - - - 104 19 97 % - -   04/10/18 2030 93/68 - - - 105 19 96 % - -   04/10/18 2000 92/60 - - - 115 20 96 % - -   04/10/18 1919 113/76 97.2  F (36.2  C) Temporal 136 - 24 95 % 1.676 m (5' 6\") 89.8 kg (198 lb)     Physical Exam   Skin:            General:   Pleasant, age appropriate, ill appearing female.  HEENT:    Oropharynx is moist, without lesions or trismus.  Eyes:    Conjunctiva normal  Neck:    Supple, no meningismus.     CV:     Tachycardic, regular rhythm.      No murmurs, rubs or gallops.       No JVD or unilateral leg swelling.       2+ radial and DP pulses bilateral.       No lower extremity edema.  PULM:    Clear to auscultation bilateral.       No respiratory distress.      Good air exchange.     No rales or wheezing.     No stridor.  ABD:    Soft, non-tender, non-distended.       No pulsatile masses.       No rebound, guarding or rigidity.  MSK:     No gross deformity to all four extremities.   LYMPH:   No cervical lymphadenopathy.  NEURO:   Alert. Good muscle tone, no atrophy.  Skin:    Warm, dry and intact.    Psych:    Mood is good and affect is appropriate.    Emergency Department Course     ECG:  ECG taken at 1946, ECG read at 1950  Sinus tachycardia  Otherwise normal ECG  Rate 116 bpm. PA interval 130. QRS duration 78. QT/QTc 344/478. P-R-T axes 31 13 17.    Laboratory:  Laboratory findings were communicated with the patient who voiced understanding of the findings.  CBC: AWNL. (WBC 10.6, HGB 14.0, )   CMP: Potassium: 3.3 (L), Glucose: 107 (H) o/w WNL (Creatinine 0.79)  Lactic acid whole blood (collected at 1935): 1.6  ISTAT gases lactate " diallo POCT: 7.44/28/39/19/77, lactic acid: 1.6  UA: pending  HCG Qual: Negative  Urine culture: Pending  Blood culture: Pending  INR: 1.08  PTT: 30    Interventions:   Dilaudid, 0.5 mg, IV   Lactated ringers, 1000 mL, IV   Zofran, 4 mg, IV   Unasyn, 3 g, IV   Cipro, 400 mg, IV   Lactated ringers, 1000 mL, IV   Vancocin, 2250 mg, IV    Emergency Department Course:  Nursing notes and vitals reviewed.   I had my initial encounter with the patient.  I performed an exam of the patient as documented above.   IV was inserted and blood was drawn for laboratory testing, results above.  The patient provided a urine sample here in the emergency department. This was sent for laboratory testing, findings above.  EKG obtained in the ED, see results above.    I spoke with Dr. Davis regarding this patient.     I discussed the treatment plan with the patient. They expressed understanding of this plan and consented to admission. I discussed the patient with Dr. Davis, who will admit the patient to a monitored bed for further evaluation and treatment.    Impression & Plan      Medical Decision Makin-year-old female presented to the ED with fever, chills and obvious left lower extremity cellulitis.  In regards to her cellulitis, she has no signs of necrotizing fasciitis or deep space infection.  She was tachycardic without presenting hypotension.  Basic laboratory studies were reassuring.  Her tachycardia largely improved with IV fluids.  She did have mild hypotension to the mid 90s which responded well to IV fluids.  She was given broad-spectrum antibiotics with Unasyn, vancomycin and ciprofloxacin.  This antibiotic regimen was chosen as she did have recent freshwater exposure in [place] which exposes her to risk of vibrio infection.  Patient clinically feels improved and clearly will need admission for sepsis related to left lower extremity cellulitis.    ADDENDUM: After discussion  with the hospitalist the patient did have blood pressures that remained in the mid 90s. Patient was given additional IV fluids.  During her prolonged ED evaluation she continues to have blood pressures in the mid 90s but mean arterial pressure has remained largely 70-75.  Heart rate continued to improve, she continues to look well. She has been upgraded to the ICU. Given the lack of deprssed mean arterial pressure, I will not place a central line at this time.  She will remain in the ED while her last 500 cc of fluid will be infused prior to transfer to the intensive care unit.  Dr. Bridges will follow up on vital sign resposne and if significant hypotension does occur, will consider intervening with central venous access and possible need of vasopressor initiation although not necessary at this time.    Diagnosis:    ICD-10-CM    1. Sepsis, due to unspecified organism (H) A41.9    2. Left leg cellulitis L03.116      Disposition:   Admission    Scribe Disclosure:  I, Ruben Tucker, am serving as a scribe at 7:28 PM on 4/10/2018 to document services personally performed by James Page MD, based on my observations and the provider's statements to me.    4/10/2018   [facility] EMERGENCY DEPARTMENT       James Page MD  04/10/18 1035

## 2018-04-11 NOTE — CONSULTS
ID consult dictated  IMP 1 51 yo female , 1st episode L Leg cellulitis, 2 wks prior ocean water exposure, but likely  Strep    REc ancef, increase dose, for now cipro/doxy also

## 2018-04-11 NOTE — ED NOTES
Patient complaining of chills , shortness of breath and redness and swelling to left lower leg.      ABCs intact.  Alert and oriented x 3.

## 2018-04-11 NOTE — PROGRESS NOTES
[facility]  Hospitalist Progress Note  Kavita Veras MD 04/11/18  Text Page  Pager: [phone] (7am-6pm)    Reason for Stay (Diagnosis): Sepsis due to left lower extremity cellulitis         Assessment and Plan:      Summary of Stay: April Ackerman is a 52 year old female with past medical history of seasonal allergies who recently returned from vacation from [place] who was admitted on 4/10/2018 with an oral malaise, myalgias as well as significant left leg pain and swelling and was found to have sepsis due to left lower extremity cellulitis.    Problem List/Assessment and Plan:     1.  Sepsis due to left lower extremity cellulitis: Patient had been in [place] about 2 weeks ago.  She does recall cutting her leg while shaving.  She did swim in the ocean as well as the pool while on vacation.  She had evidence of sepsis including hypotension, tachycardia, to tachypnea and known infection.  She was fluid resuscitated and blood pressure is stable.  She was initially given vancomycin, ciprofloxacin and ampicillin in the emergency room.  This was changed to Ancef, doxycycline and levofloxacin upon arrival to the floor given her exposure to fresh and salt water.  She did have a ultrasound which was negative for DVT.  -Pain control with Tylenol, oxycodone or IV Dilaudid for severe pain  -Continue Ancef, doxycycline and levofloxacin  -Consult infectious disease  -Continue IV fluids    2.  Hypokalemia: Suspect this is due to poor p.o. intake from generally not feeling well.  Replace per protocol.    3.  Insomnia: Patient had a difficult time sleeping last night.  Requesting medication to help with this.  Trazodone as needed has been ordered.    # Pain Assessment:  Current Pain Score 4/11/2018   Pain score (0-10) 4   - April is experiencing pain due to cellulitis. Pain management was discussed and the plan was created in a collaborative fashion.  April's response to the current recommendations: engaged  -  "Please see the plan for pain management as documented above          DVT Prophylaxis: Enoxaparin (Lovenox) SQ  Code Status: Full Code  Discharge Dispo: Home  Estimated Disch Date / # of Days until Disch: Expect at least 2 more days given sepsis and significant cellulitis    Patient can transfer to the medical floor without telemetry        Interval History (Subjective):      Patient feels a little better than last night.  Leg is not extremely painful when it is not moving.  When she got up to the bathroom it was quite painful.  No CP, SOB, nausea, emesis.  Appetite is a little better today.  Feels less fatigued overall.                  Physical Exam:      Last Vital Signs:  BP 92/60  Pulse 136  Temp 97.6  F (36.4  C)  Resp 16  Ht 1.676 m (5' 6\")  Wt 96.7 kg (213 lb 3 oz)  SpO2 96%  BMI 34.41 kg/m2    General: Alert, awake, no acute distress.  HEENT: Normocephalic and atraumatic, eyes anicteric and without scleral injection, EOMI, face symmetric, MMM.  Cardiac: RRR, normal S1, S2. No m/g/r, no LE edema.  Pulmonary: Normal chest rise, normal work of breathing.  Lungs CTAB without crackles or wheezing.  Abdomen: soft, non-tender, non-distended.  Normoactive bowel sounds, no guarding or rebound tenderness.  Extremities: swelling of LLE.  Warm, well perfused.  Skin: Extensive swelling of LLE (2+ edema) with tenderness and warmth and erythema.  Warm and Dry.  Neuro: No focal deficits.  Speech clear.  Coordination and strength grossly normal.  Psych: Alert and oriented x3. Appropriate affect.         Medications:      All current medications were reviewed with changes reflected in problem list.         Data:      All new lab and imaging data was reviewed.   Labs:    Recent Labs  Lab 04/11/18  0519 04/10/18  1935   NA  --  133   POTASSIUM 4.1 3.3*   CHLORIDE  --  101   CO2  --  23   ANIONGAP  --  9   GLC  --  107*   BUN  --  10   CR 0.70 0.79   GFRESTIMATED 87 76   GFRESTBLACK >90 >90   KRISS  --  8.8       Recent " Labs  Lab 04/11/18  0519 04/10/18  1935   WBC  --  10.6   HGB  --  14.0   HCT  --  40.9   MCV  --  96    212      Imaging:   Recent Results (from the past 24 hour(s))   US Lower Extremity Venous Duplex Left    Narrative    US LOWER EXTREMITY VENOUS DUPLEX LEFT   4/11/2018 2:21 AM     HISTORY: Left leg swelling.    COMPARISON: None.    FINDINGS: Gray-scale, color and Doppler spectral analysis ultrasound  was performed of the left leg. Compression and augmentation imaging  was performed.    There is no evidence for deep venous thrombosis. The veins compress  and augment normally.      Impression    IMPRESSION: No DVT.    MD Kavita DIXON MD.

## 2018-04-11 NOTE — H&P
[facility]  Hospitalist H&P    Name: April Ackerman      MRN: [id number]  YOB: 1965    Age: 52 year old  Date of admission: 4/10/2018  Primary care provider: Clinic, [facility]            Assessment and Plan:   April Ackerman is a 52 year old female with a history of seasonal allergies who presents with left leg cellulitis and sepsis.    1.  Sepsis.  Due to left leg cellulitis.  Most recent blood pressure 85/60.  This is after sepsis appropriate fluid bolus in the emergency room.  Admit to the intensive care unit continue antibiotics with IV Ancef, Levaquin, and doxycycline due to suspected source of left leg cellulitis with both fresh and seawater exposures.  Place a PICC line.  Will try 1 more liter of IV fluid bolus to bring blood pressure up.  If this does not adequately increase blood pressure, would start norepinephrine.  Check influenza PCR.    2.  Left leg cellulitis.  With recent fresh and seawater exposures.  IV antibiotics with IV Ancef, Levaquin, and doxycycline due to water exposure.  Also check left leg Doppler ultrasound.    3.  Left leg swelling.  Check left leg Doppler ultrasound.    4.  Hypokalemia.  Potassium replacement protocol.     5.  Hypotension.  Due to sepsis.  IV fluid boluses as noted above.  If inadequate response to 1 more liter of normal saline IV fluid bolus, start norepinephrine.    Code status: Full code.  Admit to inpatient ICU.  Prophylaxis: Lovenox.              Chief Complaint:   Left leg redness.         History of Present Illness:   April Ackerman is a 52 year old female who presents with fevers, chills, body aches, left leg pain, swelling, and erythema.  She returned from a trip to [place] 2 weeks ago.  Initially did have diarrhea for the first few days after returning from [place].  Diarrhea has resolved.  Has not had diarrhea for the past several days.  Yesterday, she began having fevers and chills.  Also having body aches.  Nausea.   Couple episodes of vomiting.  She has not had a return of diarrhea.  Today, she began having left leg pain.  A little later in the day, she began having left leg swelling and erythema.  Presented to emergency room for further evaluation.  She does note, that while in [place], she was swimming in a pool and in the ocean.  She does remember having cut her left leg shaving before going in the ocean.  Nothing at home was helping symptoms.  Pain medications given in the emergency room are helping bring pain under better control.  She has never had anything like this previously.  She does feel quite weak today.  No other complaints.            Past Medical History:   History reviewed. No pertinent past medical history.          Past Surgical History:   No past surgical history on file.          Social History:     Social History   Substance Use Topics     Smoking status: Not on file     Smokeless tobacco: Not on file     Alcohol use Not on file             Family History:   The family history was fully reviewed and non-contributory in this case.         Allergies:   No Known Allergies          Medications:     Prior to Admission medications    Medication Sig Last Dose Taking? Auth Provider   loratadine (CLARITIN) 10 MG tablet Take 10 mg by mouth daily 4/10/2018 at Unknown time Yes Unknown, Entered By History   multivitamin, therapeutic with minerals (THERA-VIT-M) TABS tablet Take 1 tablet by mouth daily 4/10/2018 at Unknown time Yes Unknown, Entered By History   naproxen sodium (ALEVE) 220 MG tablet Take 220 mg by mouth 2 times daily as needed for moderate pain  Yes Unknown, Entered By History             Review of Systems:   A Comprehensive greater than 10 system review of systems was carried out.  Pertinent positives and negatives are noted above.  Otherwise negative for contributory information.           Physical Exam:   Blood pressure 105/72, pulse 136, temperature 98.2  F (36.8  C), temperature source Oral, resp.  "rate 28, height 1.676 m (5' 6\"), weight 89.8 kg (198 lb), SpO2 95 %.  Wt Readings from Last 1 Encounters:   04/10/18 89.8 kg (198 lb)     Exam:  GENERAL: No apparent distress. Awake, alert, and fully oriented.  HEENT: Normocephalic, atraumatic. Extraocular movements intact.  CARDIOVASCULAR: Regular rate and rhythm without murmurs or rubs. No S3.  PULMONARY: Clear to auscultation bilaterally.  ABDOMINAL: Soft, non-tender, non-distended. Bowel sounds normoactive.   EXTREMITIES: No cyanosis or clubbing.  Left leg is noticeably swollen compared to right leg.  No pitting edema present in either leg.  NEUROLOGICAL: CN 2-12 grossly intact, no focal neurological deficits.  DERMATOLOGICAL: Left leg has erythema present diffusely.  Left leg is also warmer to the touch than right leg.         Data:     Laboratory:    Recent Labs  Lab 04/10/18  1935   WBC 10.6   HGB 14.0   HCT 40.9   MCV 96          Recent Labs  Lab 04/10/18  1935      POTASSIUM 3.3*   CHLORIDE 101   CO2 23   ANIONGAP 9   *   BUN 10   CR 0.79   GFRESTIMATED 76   GFRESTBLACK >90   KRISS 8.8     No results for input(s): CULT in the last 168 hours.    Imaging:  No results found for this or any previous visit (from the past 24 hour(s)).      "

## 2018-04-12 ENCOUNTER — ANESTHESIA EVENT (OUTPATIENT)
Dept: MEDSURG UNIT | Facility: CLINIC | Age: 53
DRG: 872 | End: 2018-04-12
Payer: COMMERCIAL

## 2018-04-12 ENCOUNTER — ANESTHESIA (OUTPATIENT)
Dept: MEDSURG UNIT | Facility: CLINIC | Age: 53
DRG: 872 | End: 2018-04-12
Payer: COMMERCIAL

## 2018-04-12 LAB
ERYTHROCYTE [DISTWIDTH] IN BLOOD BY AUTOMATED COUNT: 13.7 % (ref 10–15)
HCT VFR BLD AUTO: 34.5 % (ref 35–47)
HGB BLD-MCNC: 11.2 G/DL (ref 11.7–15.7)
MCH RBC QN AUTO: 32.3 PG (ref 26.5–33)
MCHC RBC AUTO-ENTMCNC: 32.5 G/DL (ref 31.5–36.5)
MCV RBC AUTO: 99 FL (ref 78–100)
PLATELET # BLD AUTO: 161 10E9/L (ref 150–450)
RBC # BLD AUTO: 3.47 10E12/L (ref 3.8–5.2)
WBC # BLD AUTO: 11.1 10E9/L (ref 4–11)

## 2018-04-12 PROCEDURE — 37000011 ZZH ANESTHESIA WARD SERVICE

## 2018-04-12 PROCEDURE — 25000128 H RX IP 250 OP 636: Performed by: INTERNAL MEDICINE

## 2018-04-12 PROCEDURE — 99232 SBSQ HOSP IP/OBS MODERATE 35: CPT | Performed by: INTERNAL MEDICINE

## 2018-04-12 PROCEDURE — 25000132 ZZH RX MED GY IP 250 OP 250 PS 637: Performed by: INTERNAL MEDICINE

## 2018-04-12 PROCEDURE — 40000671 ZZH STATISTIC ANESTHESIA CASE

## 2018-04-12 PROCEDURE — 36415 COLL VENOUS BLD VENIPUNCTURE: CPT | Performed by: INTERNAL MEDICINE

## 2018-04-12 PROCEDURE — 85027 COMPLETE CBC AUTOMATED: CPT | Performed by: INTERNAL MEDICINE

## 2018-04-12 PROCEDURE — 12000000 ZZH R&B MED SURG/OB

## 2018-04-12 PROCEDURE — 25000125 ZZHC RX 250: Performed by: INTERNAL MEDICINE

## 2018-04-12 RX ORDER — AMOXICILLIN 250 MG
2 CAPSULE ORAL 2 TIMES DAILY PRN
Status: DISCONTINUED | OUTPATIENT
Start: 2018-04-12 | End: 2018-04-15 | Stop reason: HOSPADM

## 2018-04-12 RX ORDER — IBUPROFEN 400 MG/1
400 TABLET, FILM COATED ORAL EVERY 6 HOURS PRN
Status: DISCONTINUED | OUTPATIENT
Start: 2018-04-12 | End: 2018-04-15 | Stop reason: HOSPADM

## 2018-04-12 RX ORDER — IBUPROFEN 400 MG/1
400 TABLET, FILM COATED ORAL EVERY 4 HOURS PRN
Status: DISCONTINUED | OUTPATIENT
Start: 2018-04-12 | End: 2018-04-12

## 2018-04-12 RX ORDER — POLYETHYLENE GLYCOL 3350 17 G/17G
17 POWDER, FOR SOLUTION ORAL DAILY PRN
Status: DISCONTINUED | OUTPATIENT
Start: 2018-04-12 | End: 2018-04-15 | Stop reason: HOSPADM

## 2018-04-12 RX ORDER — AMOXICILLIN 250 MG
1 CAPSULE ORAL 2 TIMES DAILY PRN
Status: DISCONTINUED | OUTPATIENT
Start: 2018-04-12 | End: 2018-04-15 | Stop reason: HOSPADM

## 2018-04-12 RX ADMIN — DOXYCYCLINE 100 MG: 100 INJECTION, POWDER, LYOPHILIZED, FOR SOLUTION INTRAVENOUS at 21:14

## 2018-04-12 RX ADMIN — SENNOSIDES AND DOCUSATE SODIUM 1 TABLET: 8.6; 5 TABLET ORAL at 08:34

## 2018-04-12 RX ADMIN — LEVOFLOXACIN 750 MG: 5 INJECTION, SOLUTION INTRAVENOUS at 02:43

## 2018-04-12 RX ADMIN — ACETAMINOPHEN 650 MG: 325 TABLET ORAL at 15:53

## 2018-04-12 RX ADMIN — PROCHLORPERAZINE MALEATE 10 MG: 5 TABLET, FILM COATED ORAL at 15:20

## 2018-04-12 RX ADMIN — DOXYCYCLINE 100 MG: 100 INJECTION, POWDER, LYOPHILIZED, FOR SOLUTION INTRAVENOUS at 04:06

## 2018-04-12 RX ADMIN — SODIUM CHLORIDE: 9 INJECTION, SOLUTION INTRAVENOUS at 14:39

## 2018-04-12 RX ADMIN — ONDANSETRON 4 MG: 2 INJECTION INTRAMUSCULAR; INTRAVENOUS at 12:04

## 2018-04-12 RX ADMIN — OXYCODONE HYDROCHLORIDE 5 MG: 5 TABLET ORAL at 08:20

## 2018-04-12 RX ADMIN — CEFAZOLIN SODIUM 2 G: 2 INJECTION, SOLUTION INTRAVENOUS at 08:20

## 2018-04-12 RX ADMIN — OXYCODONE HYDROCHLORIDE 5 MG: 5 TABLET ORAL at 02:44

## 2018-04-12 RX ADMIN — ENOXAPARIN SODIUM 40 MG: 40 INJECTION SUBCUTANEOUS at 08:20

## 2018-04-12 ASSESSMENT — ACTIVITIES OF DAILY LIVING (ADL)
ADLS_ACUITY_SCORE: 9

## 2018-04-12 NOTE — PROGRESS NOTES
"[facility]  Infectious Disease Progress Note          Assessment and Plan:   IMPRESSION:  A healthy 52-year-old female with acute sepsis and left leg cellulitis, quite typical streptococcal appearance, no obvious underlying stasis or edema and no entry site.  Recent ocean water exposure and slight cut so conceivably water borne pathogen, although timing makes that relatively unlikely as does appearance.       RECOMMENDATIONS:   1.  Continue Ancef,  For now, Cipro and doxycycline covering water borne pathogens but they are relatively unlikely here.   2.  Elevate leg, no other major workup at this point, await blood cultures to see if bacteremic.   3.  Discussed in detail with the patient. Should be no expectation for rapid improvement, this will be slow  4 treat nausea, follow labs , sxs        Interval History:   no new complaints but feels lousy. Leg pain,Bc neg, T now nl, .              Medications:       levofloxacin  750 mg Intravenous Q24H     enoxaparin  40 mg Subcutaneous Q24H     doxycycline (VIBRAMYCIN) IV  100 mg Intravenous Q12H     ceFAZolin  2 g Intravenous Q8H                  Physical Exam:   Blood pressure 126/78, pulse 136, temperature 98.4  F (36.9  C), temperature source Oral, resp. rate 16, height 1.676 m (5' 6\"), weight 96.7 kg (213 lb 3 oz), SpO2 99 %.  Wt Readings from Last 2 Encounters:   04/11/18 96.7 kg (213 lb 3 oz)     Vital Signs with Ranges  Temp:  [97.6  F (36.4  C)-100.4  F (38  C)] 98.4  F (36.9  C)  Heart Rate:  [91-97] 91  Resp:  [12-18] 16  BP: ()/(64-78) 126/78  SpO2:  [97 %-100 %] 99 %    Constitutional: Awake, alert, cooperative, no apparent distress   Lungs: Clear to auscultation bilaterally, no crackles or wheezing   Cardiovascular: Regular rate and rhythm, normal S1 and S2, and no murmur noted   Abdomen: Normal bowel sounds, soft, non-distended, non-tender   Skin: No rashes, no cyanosis, same edema leg about same   Other:           Data:   All " microbiology laboratory data reviewed.  Recent Labs   Lab Test  04/12/18   0657  04/11/18   0519  04/10/18   1935   WBC  11.1*   --   10.6   HGB  11.2*   --   14.0   HCT  34.5*   --   40.9   MCV  99   --   96   PLT  161  176  212     Recent Labs   Lab Test  04/11/18   0519  04/10/18   1935   CR  0.70  0.79     No lab results found.  Recent Labs   Lab Test  04/10/18   2016  04/10/18   1935   CULT  No growth after 2 days  No growth after 2 days

## 2018-04-12 NOTE — PROVIDER NOTIFICATION
Web based paged Madelaine AGUILAR regarding: On Lovenox 40mg once a day, adding ibuprofen 40mg every 4 hours for fever increases risk for bleeding. Ok to override and give, due to risk?

## 2018-04-12 NOTE — PLAN OF CARE
Problem: Patient Care Overview  Goal: Plan of Care/Patient Progress Review  Outcome: No Change  A&O x4. Up with 1. Cardio WNL. Lungs clear with crackles in the bases. Pt reporting no appetite. Nausea resolved with Zofran IV. Voiding without difficulty. Plan is to continue IV Abx. BC pending. Elevate LLE and apply ice.

## 2018-04-12 NOTE — PROVIDER NOTIFICATION
Web based paged Madelaine AGUILAR regarding: T: 101.2 gave 650 mg tyl, T 100.0 30 min after given. New orders?

## 2018-04-12 NOTE — PLAN OF CARE
Problem: Infection, Risk/Actual (Adult)  Goal: Identify Related Risk Factors and Signs and Symptoms  Related risk factors and signs and symptoms are identified upon initiation of Human Response Clinical Practice Guideline (CPG).   Outcome: Improving  VSS. Pt reports 3/10 LLE pain, controlled with Oxy x1. LLE red & hot to the touch,+ 3 edema, denies N/T. BS hypoactive, pt reports passing gas, but feels constipated, needs stool softeners ordered. Pt up with assist of 1, calls appropriately. BC NGTD.

## 2018-04-12 NOTE — PLAN OF CARE
Problem: Patient Care Overview  Goal: Plan of Care/Patient Progress Review  Outcome: No Change  Pt up Ax1. Alert and oriented x4. LLE red, edema and hot to touch. Leg is elevated. Pain states pain is tolerable when not moving but pain increases w/ activity. Tmax 100.4. Oxycodone given x1 during doxycycline infusion due to pt right arm burning for relief. Tylenol given x1 for headache relief. Receiving doxycycline, Levaquin, and ancef. Blood cultures neg thus far. D/C TBD.

## 2018-04-12 NOTE — PROVIDER NOTIFICATION
Web based paged admitting MD regarding: Ibuprofen ordered q4 hours for temp from Madelaine AGUILAR. On Lovenox 40mg once a day, adding ibuprofen 40mg every 4 hours for fever increases risk for bleeding. Ok to override and give, due to risk? Spoke with Franklin AGUILAR, risk is low, ok to give.

## 2018-04-12 NOTE — PROGRESS NOTES
[facility]  Hospitalist Progress Note  Escobar Burkett MD 04/11/18    Reason for Stay (Diagnosis): Sepsis due to left lower extremity cellulitis         Assessment and Plan:      Summary of Stay: April Ackerman is a 52 year old female with past medical history of seasonal allergies who recently returned from vacation from [place] who was admitted on 4/10/2018 with an oral malaise, myalgias as well as significant left leg pain and swelling and was found to have sepsis due to left lower extremity cellulitis.    Problem List/Assessment and Plan:     1.  Sepsis due to left lower extremity cellulitis:  --Patient is afebrile, stable, on antibiotic with Ancef as well as Cipro and doxy added for possible water exposure related pathogens  --Infectious disease physician input is appreciated, continue current antibiotic, elevate leg, pain control and treat nausea    2.  Hypokalemia: Suspect this is due to poor p.o. intake from generally not feeling well.  Replace per protocol.    3.  Intractable nausea: Continue nausea and vomiting treatment protocol     # Pain Assessment:  Current Pain Score 4/12/2018   Patient currently in pain? yes   Pain score (0-10) 2   Pain location Leg   Pain descriptors Aching   - April is experiencing pain due to cellulitis. Pain management was discussed and the plan was created in a collaborative fashion.  April's response to the current recommendations: engaged  - Please see the plan for pain management as documented above          DVT Prophylaxis: Enoxaparin (Lovenox) SQ  Code Status: Full Code  Discharge Dispo: Home  Estimated Disch Date / # of Days until Disch: May discharge home in the next 2-3 days once infection is controlled and antibiotic plan is recommended by ID        Interval History (Subjective):      Patient was seen and examined by me this morning.  She feels nauseous but did not vomit.  She has generalized weakness and fatigue.                  Physical Exam:     "  Last Vital Signs:  /78 (BP Location: Left arm)  Pulse 136  Temp 98.4  F (36.9  C) (Oral)  Resp 16  Ht 1.676 m (5' 6\")  Wt 96.7 kg (213 lb 3 oz)  SpO2 99%  BMI 34.41 kg/m2    General: Alert, awake, no acute distress.  HEENT: Normocephalic and atraumatic, eyes anicteric and without scleral injection, EOMI, face symmetric, MMM.  Cardiac: RRR, normal S1, S2. No m/g/r, no LE edema.  Pulmonary: Normal chest rise, normal work of breathing.  Lungs CTAB without crackles or wheezing.  Abdomen: soft, non-tender, non-distended.  Normoactive bowel sounds, no guarding or rebound tenderness.  Extremities: swelling of LLE.  Warm, well perfused.  Skin: Extensive swelling of LLE (2+ edema) with tenderness and warmth and erythema.  Warm and Dry.  Neuro: No focal deficits.  Speech clear.  Coordination and strength grossly normal.  Psych: Alert and oriented x3. Appropriate affect.         Medications:      All current medications were reviewed with changes reflected in problem list.         Data:      All new lab and imaging data was reviewed.   Labs:    Recent Labs  Lab 04/11/18  0519 04/10/18  1935   NA  --  133   POTASSIUM 4.1 3.3*   CHLORIDE  --  101   CO2  --  23   ANIONGAP  --  9   GLC  --  107*   BUN  --  10   CR 0.70 0.79   GFRESTIMATED 87 76   GFRESTBLACK >90 >90   KRISS  --  8.8       Recent Labs  Lab 04/12/18  0657 04/11/18  0519 04/10/18  1935   WBC 11.1*  --  10.6   HGB 11.2*  --  14.0   HCT 34.5*  --  40.9   MCV 99  --  96    176 212      Imaging:   No results found for this or any previous visit (from the past 24 hour(s)).           "

## 2018-04-13 LAB
CREAT SERPL-MCNC: 0.69 MG/DL (ref 0.52–1.04)
GFR SERPL CREATININE-BSD FRML MDRD: 89 ML/MIN/1.7M2
PLATELET # BLD AUTO: 194 10E9/L (ref 150–450)

## 2018-04-13 PROCEDURE — 82565 ASSAY OF CREATININE: CPT | Performed by: INTERNAL MEDICINE

## 2018-04-13 PROCEDURE — 25000132 ZZH RX MED GY IP 250 OP 250 PS 637: Performed by: INTERNAL MEDICINE

## 2018-04-13 PROCEDURE — 25000128 H RX IP 250 OP 636: Performed by: INTERNAL MEDICINE

## 2018-04-13 PROCEDURE — 85049 AUTOMATED PLATELET COUNT: CPT | Performed by: INTERNAL MEDICINE

## 2018-04-13 PROCEDURE — 25000125 ZZHC RX 250: Performed by: INTERNAL MEDICINE

## 2018-04-13 PROCEDURE — 36415 COLL VENOUS BLD VENIPUNCTURE: CPT | Performed by: INTERNAL MEDICINE

## 2018-04-13 PROCEDURE — 12000000 ZZH R&B MED SURG/OB

## 2018-04-13 PROCEDURE — 99232 SBSQ HOSP IP/OBS MODERATE 35: CPT | Performed by: INTERNAL MEDICINE

## 2018-04-13 RX ADMIN — ACETAMINOPHEN 650 MG: 325 TABLET ORAL at 11:27

## 2018-04-13 RX ADMIN — DOXYCYCLINE 100 MG: 100 INJECTION, POWDER, LYOPHILIZED, FOR SOLUTION INTRAVENOUS at 21:19

## 2018-04-13 RX ADMIN — ENOXAPARIN SODIUM 40 MG: 40 INJECTION SUBCUTANEOUS at 09:09

## 2018-04-13 RX ADMIN — CEFAZOLIN SODIUM 2 G: 2 INJECTION, SOLUTION INTRAVENOUS at 20:44

## 2018-04-13 RX ADMIN — CEFAZOLIN SODIUM 2 G: 2 INJECTION, SOLUTION INTRAVENOUS at 13:52

## 2018-04-13 RX ADMIN — LEVOFLOXACIN 750 MG: 5 INJECTION, SOLUTION INTRAVENOUS at 01:28

## 2018-04-13 RX ADMIN — ACETAMINOPHEN 650 MG: 325 TABLET ORAL at 20:49

## 2018-04-13 RX ADMIN — SENNOSIDES AND DOCUSATE SODIUM 2 TABLET: 8.6; 5 TABLET ORAL at 11:27

## 2018-04-13 RX ADMIN — SENNOSIDES AND DOCUSATE SODIUM 3 TABLET: 8.6; 5 TABLET ORAL at 21:19

## 2018-04-13 RX ADMIN — DOXYCYCLINE 100 MG: 100 INJECTION, POWDER, LYOPHILIZED, FOR SOLUTION INTRAVENOUS at 09:08

## 2018-04-13 RX ADMIN — SODIUM CHLORIDE: 9 INJECTION, SOLUTION INTRAVENOUS at 16:40

## 2018-04-13 RX ADMIN — CEFAZOLIN SODIUM 2 G: 2 INJECTION, SOLUTION INTRAVENOUS at 04:41

## 2018-04-13 ASSESSMENT — ACTIVITIES OF DAILY LIVING (ADL)
ADLS_ACUITY_SCORE: 9

## 2018-04-13 NOTE — PLAN OF CARE
Problem: Infection, Risk/Actual (Adult)  Goal: Identify Related Risk Factors and Signs and Symptoms  Related risk factors and signs and symptoms are identified upon initiation of Human Response Clinical Practice Guideline (CPG).   Outcome: Improving  VSS. Pt reports 2/10 LLE pain (worse with movement), declined intervention. LLE hot & red, denies N/T, slightly less swollen, elevated in bed overnight. BS active x4, passing gas, but still constipated. Pt up with assist of 1, calls appropriately. BC NGTD.

## 2018-04-13 NOTE — PROGRESS NOTES
"[facility]  Hospitalist Progress Note  Escobar Burkett MD 04/11/18    Reason for Stay (Diagnosis): Sepsis due to left lower extremity cellulitis         Assessment and Plan:      Summary of Stay: April Ackerman is a 52 year old female with past medical history of seasonal allergies who recently returned from vacation from [place] who was admitted on 4/10/2018 with an oral malaise, myalgias as well as significant left leg pain and swelling and was found to have sepsis due to left lower extremity cellulitis.    Problem List/Assessment and Plan:     1.  Sepsis due to left lower extremity cellulitis:  --improving but remains swollen and painful   -- no fever   -- on abx per ID recommendation     2.  Hypokalemia: Suspect this is due to poor p.o. intake from generally not feeling well.  Replace per protocol.    3.  Intractable nausea: improving ,Continue nausea and vomiting treatment protocol     # Pain Assessment:  Current Pain Score 4/13/2018   Patient currently in pain? -   Pain score (0-10) 4   Pain location -   Pain descriptors -   - April is experiencing pain due to cellulitis. Pain management was discussed and the plan was created in a collaborative fashion.  April's response to the current recommendations: engaged  - Please see the plan for pain management as documented above          DVT Prophylaxis: Enoxaparin (Lovenox) SQ  Code Status: Full Code  Discharge Dispo: Home  Estimated Disch Date / # of Days until Disch: May discharge home in the next 1-2 days if she remains stable and ID recommendations obtained.        Interval History (Subjective):      Feels better today. No fever today   Swelling better                     Physical Exam:      Last Vital Signs:  /79 (BP Location: Left arm)  Pulse 136  Temp 97.8  F (36.6  C) (Oral)  Resp 18  Ht 1.676 m (5' 6\")  Wt 96.7 kg (213 lb 3 oz)  SpO2 98%  BMI 34.41 kg/m2    General: Alert, awake, no acute distress.  HEENT: Normocephalic and " atraumatic, eyes anicteric and without scleral injection, EOMI, face symmetric, MMM.  Cardiac: RRR, normal S1, S2. No m/g/r, no LE edema.  Pulmonary: Normal chest rise, normal work of breathing.  Lungs CTAB without crackles or wheezing.  Abdomen: soft, non-tender, non-distended.  Normoactive bowel sounds, no guarding or rebound tenderness.  Extremities: swelling of LLE.  Warm, well perfused.  Skin: Extensive swelling of LLE (2+ edema) with tenderness and warmth and erythema.  Warm and Dry.  Neuro: No focal deficits.  Speech clear.  Coordination and strength grossly normal.  Psych: Alert and oriented x3. Appropriate affect.         Medications:      All current medications were reviewed with changes reflected in problem list.         Data:      All new lab and imaging data was reviewed.   Labs:    Recent Labs  Lab 04/13/18  0727 04/11/18  0519 04/10/18  1935   NA  --   --  133   POTASSIUM  --  4.1 3.3*   CHLORIDE  --   --  101   CO2  --   --  23   ANIONGAP  --   --  9   GLC  --   --  107*   BUN  --   --  10   CR 0.69 0.70 0.79   GFRESTIMATED 89 87 76   GFRESTBLACK >90 >90 >90   KRISS  --   --  8.8       Recent Labs  Lab 04/13/18 0727 04/12/18  0657 04/11/18  0519 04/10/18  1935   WBC  --  11.1*  --  10.6   HGB  --  11.2*  --  14.0   HCT  --  34.5*  --  40.9   MCV  --  99  --  96    161 176 212      Imaging:   No results found for this or any previous visit (from the past 24 hour(s)).

## 2018-04-13 NOTE — PROGRESS NOTES
"[facility]  Infectious Disease Progress Note          Assessment and Plan:   IMPRESSION:  A healthy 52-year-old female with acute sepsis and left leg cellulitis, quite typical streptococcal appearance, no obvious underlying stasis or edema and no entry site.  Recent ocean water exposure and slight cut so conceivably water borne pathogen, although timing makes that relatively unlikely as does appearance.       RECOMMENDATIONS:   1.  Continue Ancef,  For now, Cipro and doxycycline covering water borne pathogens but they are relatively unlikely here.   2.  Elevate leg, no other major workup at this point, await blood cultures to see if bacteremic.   3.  Discussed in detail with the patient. Should be no expectation for rapid improvement, this will be slow, if continued fever MRI but doubt abscess          Interval History:   no new complaints feels better. Leg pain,Bc neg, T now nl, .nausea resolved cxs neg, T to 101              Medications:       doxycycline (VIBRAMYCIN) IV  100 mg Intravenous Q12H     levofloxacin  750 mg Intravenous Q24H     enoxaparin  40 mg Subcutaneous Q24H     ceFAZolin  2 g Intravenous Q8H                  Physical Exam:   Blood pressure 121/79, pulse 136, temperature 97.8  F (36.6  C), temperature source Oral, resp. rate 18, height 1.676 m (5' 6\"), weight 96.7 kg (213 lb 3 oz), SpO2 98 %.  Wt Readings from Last 2 Encounters:   04/11/18 96.7 kg (213 lb 3 oz)     Vital Signs with Ranges  Temp:  [97.8  F (36.6  C)-101.2  F (38.4  C)] 97.8  F (36.6  C)  Heart Rate:  [88-97] 88  Resp:  [18-20] 18  BP: (121-131)/(78-85) 121/79  SpO2:  [97 %-98 %] 98 %    Constitutional: Awake, alert, cooperative, no apparent distress   Lungs: Clear to auscultation bilaterally, no crackles or wheezing   Cardiovascular: Regular rate and rhythm, normal S1 and S2, and no murmur noted   Abdomen: Normal bowel sounds, soft, non-distended, non-tender   Skin: No rashes, no cyanosis, same edema leg " about same   Other:           Data:   All microbiology laboratory data reviewed.  Recent Labs   Lab Test  04/13/18 0727 04/12/18   0657  04/11/18   0519  04/10/18   1935   WBC   --   11.1*   --   10.6   HGB   --   11.2*   --   14.0   HCT   --   34.5*   --   40.9   MCV   --   99   --   96   PLT  194  161  176  212     Recent Labs   Lab Test  04/13/18   0727  04/11/18   0519  04/10/18   1935   CR  0.69  0.70  0.79     No lab results found.  Recent Labs   Lab Test  04/10/18   2016  04/10/18   1935   CULT  No growth after 3 days  No growth after 3 days

## 2018-04-13 NOTE — PLAN OF CARE
Problem: Patient Care Overview  Goal: Plan of Care/Patient Progress Review  Outcome: No Change  Patient alert and oriented. Patient had increased temperature during shift, prn tylenol given and temperature decreased. Patient is SBA and touch toe weight bearing. with mobility. Patient's LLE cellulitis is hot to touch, ice applied and effective with pain management. Elevated Patient on regular diet with good appetite. Patient on IV ancef, doxycyline, and levaquin.

## 2018-04-14 PROCEDURE — 25000128 H RX IP 250 OP 636: Performed by: INTERNAL MEDICINE

## 2018-04-14 PROCEDURE — 25000132 ZZH RX MED GY IP 250 OP 250 PS 637: Performed by: INTERNAL MEDICINE

## 2018-04-14 PROCEDURE — 25000125 ZZHC RX 250: Performed by: INTERNAL MEDICINE

## 2018-04-14 PROCEDURE — 99232 SBSQ HOSP IP/OBS MODERATE 35: CPT | Performed by: INTERNAL MEDICINE

## 2018-04-14 PROCEDURE — 12000000 ZZH R&B MED SURG/OB

## 2018-04-14 RX ADMIN — ACETAMINOPHEN 650 MG: 325 TABLET ORAL at 23:58

## 2018-04-14 RX ADMIN — LEVOFLOXACIN 750 MG: 5 INJECTION, SOLUTION INTRAVENOUS at 01:25

## 2018-04-14 RX ADMIN — CEFAZOLIN SODIUM 2 G: 2 INJECTION, SOLUTION INTRAVENOUS at 13:43

## 2018-04-14 RX ADMIN — SODIUM CHLORIDE: 9 INJECTION, SOLUTION INTRAVENOUS at 13:46

## 2018-04-14 RX ADMIN — CEFAZOLIN SODIUM 2 G: 2 INJECTION, SOLUTION INTRAVENOUS at 05:18

## 2018-04-14 RX ADMIN — ENOXAPARIN SODIUM 40 MG: 40 INJECTION SUBCUTANEOUS at 09:29

## 2018-04-14 RX ADMIN — CEFAZOLIN SODIUM 2 G: 2 INJECTION, SOLUTION INTRAVENOUS at 19:59

## 2018-04-14 RX ADMIN — ACETAMINOPHEN 650 MG: 325 TABLET ORAL at 09:59

## 2018-04-14 RX ADMIN — DOXYCYCLINE 100 MG: 100 INJECTION, POWDER, LYOPHILIZED, FOR SOLUTION INTRAVENOUS at 09:29

## 2018-04-14 RX ADMIN — DOXYCYCLINE 100 MG: 100 INJECTION, POWDER, LYOPHILIZED, FOR SOLUTION INTRAVENOUS at 20:31

## 2018-04-14 ASSESSMENT — ACTIVITIES OF DAILY LIVING (ADL)
ADLS_ACUITY_SCORE: 9

## 2018-04-14 NOTE — PLAN OF CARE
Problem: Patient Care Overview  Goal: Plan of Care/Patient Progress Review  Outcome: No Change  Pt admitted on 04/10 with LLE cellulitis. IV levaquin, ancef, and doxycycline scheduled. Complained of swelling below IV site in fingers but did not cause her pain. Nursing will continue to monitor. Pt has given senna x1, no results. BS active. Sween cream applied to LLE, seems to help with the fullness/dryness feeling. Regular diet, tolerating well. Pt LLE causing her a lot of pain when ambulating, tylenol given once, did not help.

## 2018-04-14 NOTE — PROGRESS NOTES
[facility]  Hospitalist Progress Note  Escobar Burkett MD 04/11/18    Reason for Stay (Diagnosis): Sepsis due to left lower extremity cellulitis         Assessment and Plan:      Summary of Stay: April Ackerman is a 52 year old female with past medical history of seasonal allergies who recently returned from vacation from [place] who was admitted on 4/10/2018 with an oral malaise, myalgias as well as significant left leg pain and swelling and was found to have sepsis due to left lower extremity cellulitis.    Problem List/Assessment and Plan:     1.  Sepsis due to left lower extremity cellulitis:  --improving but remains swollen and painful   -- no fever   -- on abx per ID recommendation   --Patient swelling is much better but continues to have erythema which is also improving.  Continue intravenous antibiotic with Levaquin and Ancef.  Further antibiotic plan.  Infectious disease physician recommendation    2.  Hypokalemia: Suspect this is due to poor p.o. intake from generally not feeling well.  Replace per protocol.    3.  Intractable nausea: improving ,Continue nausea and vomiting treatment protocol     # Pain Assessment:  Current Pain Score 4/14/2018   Patient currently in pain? yes   Pain score (0-10) -   Pain location -   Pain descriptors -   - April is experiencing pain due to cellulitis. Pain management was discussed and the plan was created in a collaborative fashion.  April's response to the current recommendations: engaged  - Please see the plan for pain management as documented above          DVT Prophylaxis: Enoxaparin (Lovenox) SQ  Code Status: Full Code  Discharge Dispo: Home  Estimated Disch Date / # of Days until Disch: Discharge plan as recommended by infectious disease physician.  Likely in the next 1 or 2 days.        Interval History (Subjective):        Patient was seen and examined by me this morning, no chest pain or shortness of breath.  No fevers swelling is better,  "erythema is better.  Patient's significant other in the room and questions and concerns addressed.         Physical Exam:      Last Vital Signs:  /87 (BP Location: Left arm)  Pulse 136  Temp 96.5  F (35.8  C) (Oral)  Resp 16  Ht 1.676 m (5' 6\")  Wt 96.7 kg (213 lb 3 oz)  SpO2 96%  BMI 34.41 kg/m2    General: Alert, awake, no acute distress.  HEENT: Normocephalic and atraumatic, eyes anicteric and without scleral injection, EOMI, face symmetric, MMM.  Cardiac: RRR, normal S1, S2. No m/g/r, no LE edema.  Pulmonary: Normal chest rise, normal work of breathing.  Lungs CTAB without crackles or wheezing.  Abdomen: soft, non-tender, non-distended.  Normoactive bowel sounds, no guarding or rebound tenderness.  Extremities: swelling of LLE.  Warm, well perfused.  Skin: Extensive swelling of LLE (2+ edema) with tenderness and warmth and erythema.  Warm and Dry.  Neuro: No focal deficits.  Speech clear.  Coordination and strength grossly normal.  Psych: Alert and oriented x3. Appropriate affect.         Medications:      All current medications were reviewed with changes reflected in problem list.         Data:      All new lab and imaging data was reviewed.   Labs:    Recent Labs  Lab 04/13/18  0727 04/11/18  0519 04/10/18  1935   NA  --   --  133   POTASSIUM  --  4.1 3.3*   CHLORIDE  --   --  101   CO2  --   --  23   ANIONGAP  --   --  9   GLC  --   --  107*   BUN  --   --  10   CR 0.69 0.70 0.79   GFRESTIMATED 89 87 76   GFRESTBLACK >90 >90 >90   KRISS  --   --  8.8       Recent Labs  Lab 04/13/18 0727 04/12/18  0657 04/11/18  0519 04/10/18  1935   WBC  --  11.1*  --  10.6   HGB  --  11.2*  --  14.0   HCT  --  34.5*  --  40.9   MCV  --  99  --  96    161 176 212      Imaging:   No results found for this or any previous visit (from the past 24 hour(s)).           "

## 2018-04-14 NOTE — PLAN OF CARE
Problem: Patient Care Overview  Goal: Plan of Care/Patient Progress Review  Outcome: Improving  Pt up AX1 w/ walker. Alert and oriented x4. LLE is red, blotchy and edematous. Leg has been elevated. VSS. Tylenol  given x1 for  LLE pain relief, ice applied for pain relief. Pt had BM today. Fine crackles heard in lower lobes. No SOB reported. NS going at 100 mL/hr. Receiving ancef, doxycycline and levaquin. ID following. D/C TBD.

## 2018-04-15 VITALS
BODY MASS INDEX: 34.26 KG/M2 | SYSTOLIC BLOOD PRESSURE: 122 MMHG | HEART RATE: 89 BPM | TEMPERATURE: 96.3 F | OXYGEN SATURATION: 98 % | HEIGHT: 66 IN | DIASTOLIC BLOOD PRESSURE: 80 MMHG | WEIGHT: 213.19 LBS | RESPIRATION RATE: 18 BRPM

## 2018-04-15 PROCEDURE — 99239 HOSP IP/OBS DSCHRG MGMT >30: CPT | Performed by: INTERNAL MEDICINE

## 2018-04-15 PROCEDURE — 25000125 ZZHC RX 250: Performed by: INTERNAL MEDICINE

## 2018-04-15 PROCEDURE — 25000128 H RX IP 250 OP 636: Performed by: INTERNAL MEDICINE

## 2018-04-15 RX ORDER — OXYCODONE HYDROCHLORIDE 5 MG/1
5-10 TABLET ORAL
Qty: 14 TABLET | Refills: 0 | Status: SHIPPED | OUTPATIENT
Start: 2018-04-15 | End: 2018-04-15

## 2018-04-15 RX ADMIN — ENOXAPARIN SODIUM 40 MG: 40 INJECTION SUBCUTANEOUS at 09:27

## 2018-04-15 RX ADMIN — DOXYCYCLINE 100 MG: 100 INJECTION, POWDER, LYOPHILIZED, FOR SOLUTION INTRAVENOUS at 09:27

## 2018-04-15 RX ADMIN — CEFAZOLIN SODIUM 2 G: 2 INJECTION, SOLUTION INTRAVENOUS at 04:43

## 2018-04-15 RX ADMIN — LEVOFLOXACIN 750 MG: 5 INJECTION, SOLUTION INTRAVENOUS at 02:22

## 2018-04-15 RX ADMIN — SODIUM CHLORIDE: 9 INJECTION, SOLUTION INTRAVENOUS at 07:13

## 2018-04-15 RX ADMIN — CEFAZOLIN SODIUM 2 G: 2 INJECTION, SOLUTION INTRAVENOUS at 13:52

## 2018-04-15 ASSESSMENT — ACTIVITIES OF DAILY LIVING (ADL)
ADLS_ACUITY_SCORE: 9

## 2018-04-15 NOTE — PROGRESS NOTES
Pt to D/C to home.  Pt provided with d/c instructions, including new medications, when medications were last given, and when to take them again.  Pt also informed to f/u with primary doctor in 7 days.  Pt verbalized understanding of all d/c and f/u instructions.  All questions were answered at this time.  Copy of paperwork sent with pt.  Medication   (augmentin) was filled at [facility] per pt.  [facility] to provide transport.  All personal belongings sent with pt.

## 2018-04-15 NOTE — PROGRESS NOTES
"[facility]  Infectious Disease Progress Note          Assessment and Plan:   IMPRESSION:  A healthy 52-year-old female with acute sepsis and left leg cellulitis, quite typical streptococcal appearance, no obvious underlying stasis or edema and no entry site.  Recent ocean water exposure and slight cut so conceivably water borne pathogen, although timing makes that relatively unlikely as does appearance.       RECOMMENDATIONS:   1.  Likely conventional strep, keep leg up at home, OK disposition on [id number] bid for 7 days more alone  2 She will call us if issues          Interval History:   no new complaints feels better. Leg pain,Bc neg, T now nl, .nausea resolved cxs neg, now afeb              Medications:       doxycycline (VIBRAMYCIN) IV  100 mg Intravenous Q12H     levofloxacin  750 mg Intravenous Q24H     enoxaparin  40 mg Subcutaneous Q24H     ceFAZolin  2 g Intravenous Q8H                  Physical Exam:   Blood pressure 122/85, pulse 89, temperature 96.4  F (35.8  C), temperature source Oral, resp. rate 20, height 1.676 m (5' 6\"), weight 96.7 kg (213 lb 3 oz), SpO2 93 %.  Wt Readings from Last 2 Encounters:   04/11/18 96.7 kg (213 lb 3 oz)     Vital Signs with Ranges  Temp:  [96.4  F (35.8  C)-98.1  F (36.7  C)] 96.4  F (35.8  C)  Pulse:  [89] 89  Heart Rate:  [78-90] 78  Resp:  [16-20] 20  BP: (119-122)/(71-85) 122/85  SpO2:  [93 %-97 %] 93 %    Constitutional: Awake, alert, cooperative, no apparent distress   Lungs: Clear to auscultation bilaterally, no crackles or wheezing   Cardiovascular: Regular rate and rhythm, normal S1 and S2, and no murmur noted   Abdomen: Normal bowel sounds, soft, non-distended, non-tender   Skin: No rashes, no cyanosis, same edema leg about same   Other:           Data:   All microbiology laboratory data reviewed.  Recent Labs   Lab Test  04/13/18   0727  04/12/18   0657  04/11/18   0519  04/10/18   1935   WBC   --   11.1*   --   10.6   HGB   --   " 11.2*   --   14.0   HCT   --   34.5*   --   40.9   MCV   --   99   --   96   PLT  194  161  176  212     Recent Labs   Lab Test  04/13/18   0727  04/11/18   0519  04/10/18   1935   CR  0.69  0.70  0.79     No lab results found.  Recent Labs   Lab Test  04/10/18   2016  04/10/18   1935   CULT  No growth after 5 days  No growth after 5 days

## 2018-04-15 NOTE — PLAN OF CARE
Problem: Patient Care Overview  Goal: Plan of Care/Patient Progress Review  Outcome: No Change  Pt admitted on 04/10 for LLE cellulitis. No BMs this evening, but has had 2 BM in the AM. VSS. Regular diet, tolerating well. SBA. Sween cream applied to LLE.

## 2018-04-15 NOTE — PLAN OF CARE
Problem: Patient Care Overview  Goal: Plan of Care/Patient Progress Review  Outcome: Improving  Patient alert and oriented. Patient SBA with mobility and walker. VSS. Cellulitis to LLE. 2+ edema to left foot and anshul, 1+ to leg. LLE is hot to touch. Pain to LLE alleviated with tylenol x1 and ice. Patient seen by infectious disease. Patient currently on menses.

## 2018-04-15 NOTE — PLAN OF CARE
Problem: Patient Care Overview  Goal: Plan of Care/Patient Progress Review  Outcome: Improving  Pt up SBA w/ walker. VSS, LLE is red, edematous and blotchy. Pt has had minimal pain today and stated she is feeling better. ID following. Receiving ancef, cipro and doxycycline. Plan is to d/c pending on ID consult this afternoon.

## 2018-04-15 NOTE — PROGRESS NOTES
April was seen and examined by me this morning.  Left lower extremity swelling is slightly better, erythema is better as well.  Patient has no fever or chills.  She wants to get a walker to ambulate at home.  Plan is to await infectious disease recommendation regarding antibiotic choice and duration of therapy, may discharge later today on oral antibiotic and pain medications to follow-up with her primary care provider.  Patient was instructed to come back to the emergency department if she develops more swelling and significant pain for more testing such as an MRI study.

## 2018-04-15 NOTE — PROGRESS NOTES
"[facility]  Infectious Disease Progress Note          Assessment and Plan:   IMPRESSION:  A healthy 52-year-old female with acute sepsis and left leg cellulitis, quite typical streptococcal appearance, no obvious underlying stasis or edema and no entry site.  Recent ocean water exposure and slight cut so conceivably water borne pathogen, although timing makes that relatively unlikely as does appearance.       RECOMMENDATIONS:   1.  Continue Ancef,  For now, Cipro and doxycycline covering water borne pathogens but they are relatively unlikely here.   2.  Elevate leg, no other major workup at this point, await blood cultures to see if bacteremic.   3.  Discussed in detail with the patient and . Should be no expectation for rapid improvement, this will be slow, if continued fever MRI but doubt abscess  4 Likely Ok po and home tomorrow, as long as stays off leg a lot at home          Interval History:   no new complaints feels better. Leg pain,Bc neg, T now nl, .nausea resolved cxs neg, now afeb              Medications:       doxycycline (VIBRAMYCIN) IV  100 mg Intravenous Q12H     levofloxacin  750 mg Intravenous Q24H     enoxaparin  40 mg Subcutaneous Q24H     ceFAZolin  2 g Intravenous Q8H                  Physical Exam:   Blood pressure 121/80, pulse 89, temperature 98.1  F (36.7  C), temperature source Oral, resp. rate 16, height 1.676 m (5' 6\"), weight 96.7 kg (213 lb 3 oz), SpO2 97 %.  Wt Readings from Last 2 Encounters:   04/11/18 96.7 kg (213 lb 3 oz)     Vital Signs with Ranges  Temp:  [96.5  F (35.8  C)-98.1  F (36.7  C)] 98.1  F (36.7  C)  Pulse:  [89] 89  Heart Rate:  [87-90] 87  Resp:  [16] 16  BP: (119-127)/(79-87) 121/80  SpO2:  [96 %-97 %] 97 %    Constitutional: Awake, alert, cooperative, no apparent distress   Lungs: Clear to auscultation bilaterally, no crackles or wheezing   Cardiovascular: Regular rate and rhythm, normal S1 and S2, and no murmur noted   Abdomen: " Normal bowel sounds, soft, non-distended, non-tender   Skin: No rashes, no cyanosis, same edema leg about same   Other:           Data:   All microbiology laboratory data reviewed.  Recent Labs   Lab Test  04/13/18 0727 04/12/18 0657  04/11/18   0519  04/10/18   1935   WBC   --   11.1*   --   10.6   HGB   --   11.2*   --   14.0   HCT   --   34.5*   --   40.9   MCV   --   99   --   96   PLT  194  161  176  212     Recent Labs   Lab Test  04/13/18   0727  04/11/18   0519  04/10/18   1935   CR  0.69  0.70  0.79     No lab results found.  Recent Labs   Lab Test  04/10/18   2016  04/10/18   1935   CULT  No growth after 4 days  No growth after 4 days

## 2018-04-16 LAB
BACTERIA SPEC CULT: NO GROWTH
BACTERIA SPEC CULT: NO GROWTH
Lab: NORMAL
Lab: NORMAL
SPECIMEN SOURCE: NORMAL
SPECIMEN SOURCE: NORMAL

## 2018-04-16 NOTE — DISCHARGE SUMMARY
"Physician Discharge Summary     Name: April Ackerman    MRN: [id number]     YOB: 1965    Age: 52 year old                                                 Primary care provider: [facility], [facility]      Admit date:  4/10/2018      Discharge date and time: 4/15/2018  4:08 PM       Discharge Physician:  Escobar Burkett        Discharge Diagnosis and brief summary        #1.    Left lower extremity cellulitis  #2.    Sepsis secondary to cellulitis  #3.    Hypokalemia                Brief Summary of Hospital stay :       Please refer to  Admission H&P note for full details of patient presentation.      Admission Condition: poor     Discharged Condition: stable    /80 (BP Location: Left arm)  Pulse 89  Temp 96.3  F (35.7  C) (Oral)  Resp 18  Ht 1.676 m (5' 6\")  Wt 96.7 kg (213 lb 3 oz)  SpO2 98%  BMI 34.41 kg/m2       Presenting problem/signs and symptoms:    Left leg redness and swelling    Brief Hospital Summary:    Mrs. Ackerman is a 52-year-old female with no significant past medical problems presents with left leg cellulitis and sepsis.  Patient was admitted to the hospital and was treated with intravenous antibiotic.  Patient was recently on vacation and exposure to ocean water and concern for atypical infections were considered.  Infectious disease physician was consulted and patient was treated with multiple antibiotics including Cipro and doxycycline covering waterborne pathogens.  Patient's condition improved and swelling and erythema gradually getting better.  Patient had also elevation of affected limb as well as cultures were followed.  Patient improved and became afebrile, redness and swelling improved and she was discharged with oral antibiotic as stated below.  Patient was advised to see primary care provider for a follow-up visit or come to the emergency department or see medical doctor if swelling and pain gets progressively worse for further evaluation with imaging " studies such as an MRI.        # Discharge Pain Plan:   - Patient currently has NO PAIN and is not being prescribed pain medications on discharge.           Consultations during hospital stay         PHARMACY TO DOSE VANCO  VASCULAR ACCESS ADULT IP CONSULT  INFECTIOUS DISEASES IP CONSULT      Major procedure performed/  Significant Diagnostic Studies              Results for orders placed or performed during the hospital encounter of 04/10/18   US Lower Extremity Venous Duplex Left    Narrative    US LOWER EXTREMITY VENOUS DUPLEX LEFT   4/11/2018 2:21 AM     HISTORY: Left leg swelling.    COMPARISON: None.    FINDINGS: Gray-scale, color and Doppler spectral analysis ultrasound  was performed of the left leg. Compression and augmentation imaging  was performed.    There is no evidence for deep venous thrombosis. The veins compress  and augment normally.      Impression    IMPRESSION: No DVT.    JENNIFER ZAMORANO MD         Recent Labs  Lab 04/13/18  0727 04/12/18  0657 04/11/18  0519 04/10/18  1935   WBC  --  11.1*  --  10.6   HGB  --  11.2*  --  14.0   HCT  --  34.5*  --  40.9   MCV  --  99  --  96    161 176 212       Recent Labs  Lab 04/10/18  2016 04/10/18  1935   CULT No growth No growth       Recent Labs  Lab 04/13/18  0727 04/11/18  0519 04/10/18  1935   NA  --   --  133   POTASSIUM  --  4.1 3.3*   CHLORIDE  --   --  101   CO2  --   --  23   ANIONGAP  --   --  9   GLC  --   --  107*   BUN  --   --  10   CR 0.69 0.70 0.79   GFRESTIMATED 89 87 76   GFRESTBLACK >90 >90 >90   KRISS  --   --  8.8   PROTTOTAL  --   --  7.1   ALBUMIN  --   --  3.4   BILITOTAL  --   --  0.7   ALKPHOS  --   --  73   AST  --   --  25   ALT  --   --  28         Recent Labs  Lab 04/11/18  1158 04/11/18  0758 04/11/18  0420 04/11/18  0122 04/10/18  1935   GLC  --   --   --   --  107*   * 119* 128* 135*  --              Recent Labs  Lab 04/10/18  1935   INR 1.08               Pending Results           Unresulted Labs Ordered in  the Past 30 Days of this Admission     No orders found from 2/9/2018 to 4/11/2018.              Disposition         home      Allergies       No Known Allergies         Patient Instructions and Discharge Medications              Review of your medicines      START taking       Dose / Directions    amoxicillin-clavulanate 875-125 MG per tablet   Commonly known as:  AUGMENTIN   Used for:  Left leg cellulitis        Dose:  1 tablet   Take 1 tablet by mouth 2 times daily for 7 days   Quantity:  14 tablet   Refills:  0         CONTINUE these medicines which have NOT CHANGED       Dose / Directions    ALEVE 220 MG tablet   Generic drug:  naproxen sodium        Dose:  220 mg   Take 220 mg by mouth 2 times daily as needed for moderate pain   Refills:  0       loratadine 10 MG tablet   Commonly known as:  CLARITIN        Dose:  10 mg   Take 10 mg by mouth daily   Refills:  0       multivitamin, therapeutic with minerals Tabs tablet        Dose:  1 tablet   Take 1 tablet by mouth daily   Refills:  0            Where to get your medicines      These medications were sent to [facility] [id number] [place] [address] [address] AT [facility] [place] [address] [facility] [address]     Phone:  [phone]      amoxicillin-clavulanate 875-125 MG per tablet              Discharge diet:  Active Diet Order      Diet  regular diet        Discharge activity:Activity as tolerated      Discharge follow-up:    Follow up with primary care provider in 7 days or earlier if symptoms return or gets worse.    Follow up with consultant as instructed         Other instructions:    We discussed with Patient/family about detail discharge instructions as well as discharge medications above including potential risks,side effects and benefits.Patient/family understood benefits and potential serious side effects of taking these medications and need to follow up with PCP if the patient develops complications.  Patient is  also advised to see a doctor immediately for severe symptoms.          I saw and evaluated the patient on day of discharge and  discharge instructions reviewed  and  all the patient's questions and concerns addressed.Over 30 minutes spent on discharge and coordination of discharge process for this patient.          Disclaimer: This note consists of symbols derived from keyboarding, dictation and/or voice recognition software. As a result, there may be errors in the script that have gone undetected. Please consider this when interpreting information found in this chart